# Patient Record
Sex: FEMALE | Race: WHITE | Employment: FULL TIME | URBAN - METROPOLITAN AREA
[De-identification: names, ages, dates, MRNs, and addresses within clinical notes are randomized per-mention and may not be internally consistent; named-entity substitution may affect disease eponyms.]

---

## 2018-03-12 ENCOUNTER — OFFICE VISIT (OUTPATIENT)
Dept: FAMILY MEDICINE CLINIC | Age: 48
End: 2018-03-12

## 2018-03-12 VITALS
WEIGHT: 197 LBS | SYSTOLIC BLOOD PRESSURE: 124 MMHG | OXYGEN SATURATION: 99 % | HEART RATE: 100 BPM | HEIGHT: 65 IN | TEMPERATURE: 97.9 F | DIASTOLIC BLOOD PRESSURE: 82 MMHG | RESPIRATION RATE: 19 BRPM | BODY MASS INDEX: 32.82 KG/M2

## 2018-03-12 DIAGNOSIS — E53.8 VITAMIN B12 DEFICIENCY: ICD-10-CM

## 2018-03-12 DIAGNOSIS — E66.9 OBESITY (BMI 30-39.9): Primary | ICD-10-CM

## 2018-03-12 DIAGNOSIS — I10 ESSENTIAL HYPERTENSION, BENIGN: ICD-10-CM

## 2018-03-12 DIAGNOSIS — E55.9 VITAMIN D DEFICIENCY: ICD-10-CM

## 2018-03-12 DIAGNOSIS — Z98.84 STATUS POST GASTRIC BYPASS FOR OBESITY: ICD-10-CM

## 2018-03-12 RX ORDER — DULAGLUTIDE 0.75 MG/.5ML
0.75 INJECTION, SOLUTION SUBCUTANEOUS
COMMUNITY
Start: 2018-03-02 | End: 2018-08-28 | Stop reason: SDUPTHER

## 2018-03-12 RX ORDER — PANTOPRAZOLE SODIUM 40 MG/1
40 TABLET, DELAYED RELEASE ORAL DAILY
COMMUNITY
Start: 2018-02-14 | End: 2018-09-14 | Stop reason: SDUPTHER

## 2018-03-12 RX ORDER — CYANOCOBALAMIN 1000 UG/ML
INJECTION, SOLUTION INTRAMUSCULAR; SUBCUTANEOUS
COMMUNITY
Start: 2018-03-11

## 2018-03-12 RX ORDER — VENLAFAXINE HYDROCHLORIDE 150 MG/1
CAPSULE, EXTENDED RELEASE ORAL
COMMUNITY
Start: 2018-02-14 | End: 2018-03-12 | Stop reason: SDUPTHER

## 2018-03-12 RX ORDER — HYDROCHLOROTHIAZIDE 25 MG/1
25 TABLET ORAL
COMMUNITY
Start: 2018-02-14 | End: 2021-06-16 | Stop reason: ALTCHOICE

## 2018-03-12 RX ORDER — EMPAGLIFLOZIN 10 MG/1
10 TABLET, FILM COATED ORAL DAILY
COMMUNITY
Start: 2018-02-28 | End: 2018-10-04 | Stop reason: ALTCHOICE

## 2018-03-12 RX ORDER — LISINOPRIL 5 MG/1
5 TABLET ORAL DAILY
COMMUNITY
Start: 2018-02-14 | End: 2018-07-09 | Stop reason: SDUPTHER

## 2018-03-12 NOTE — LETTER
3/21/2018 3:19 PM 
 
Ms. Chelsi Ackerman [address] [place] [address] Dear Lili Cesar: 
 
Please find your most recent results below. Resulted Orders VITAMIN B12 Result Value Ref Range Vitamin B12 1123 232 - 1245 pg/mL Narrative Performed at:  [address]  [id number] : Cliff Farrell MD, Phone:  [phone] METABOLIC PANEL, COMPREHENSIVE Result Value Ref Range Glucose 216 (H) 65 - 99 mg/dL BUN 24 6 - 24 mg/dL Creatinine 0.87 0.57 - 1.00 mg/dL GFR est non-AA 78 >59 mL/min/1.73 GFR est AA 90 >59 mL/min/1.73  
 BUN/Creatinine ratio 28 (H) 9 - 23 Sodium 135 134 - 144 mmol/L Potassium 3.6 3.5 - 5.2 mmol/L Chloride 94 (L) 96 - 106 mmol/L  
 CO2 25 18 - 29 mmol/L Calcium 9.5 8.7 - 10.2 mg/dL Protein, total 7.8 6.0 - 8.5 g/dL Albumin 4.3 3.5 - 5.5 g/dL GLOBULIN, TOTAL 3.5 1.5 - 4.5 g/dL A-G Ratio 1.2 1.2 - 2.2 Bilirubin, total 0.3 0.0 - 1.2 mg/dL Alk. phosphatase 126 (H) 39 - 117 IU/L  
 AST (SGOT) 15 0 - 40 IU/L  
 ALT (SGPT) 12 0 - 32 IU/L Narrative Performed at:  [address]  [id number] : Cliff Farrell MD, Phone:  [phone] VITAMIN D, 1, 25 DIHYDROXY Result Value Ref Range Calcitriol (Vit D 1, 25 di-OH) 71.0 19.9 - 79.3 pg/mL Narrative Performed at:  [address]  [id number] : Cliff Farrell MD, Phone:  [phone] RECOMMENDATIONS: 
The vitamin B12 level is great. You can go to an every other month or every three month injection routine. The kidney function and liver function look fine Come bck in 3 months to recheck the blood work. Please call me if you have any questions: [phone] Sincerely, 
 
 
Juvenal Beltran MD

## 2018-03-12 NOTE — PROGRESS NOTES
Chief Complaint   Patient presents with   [address]     she is a 46y.o. year old female who presents for evalution. Here to establish care, she has type 2 diabetes for 22 years  She is status post GBP  2005. She came off meds for a few years she gained 30 of 110 lbs back and the diabetes returned  She tried to avoid sweets and carbs  She was put on a 1200 irvin diet  She has a strong FH of early MI and her cholesterol is high  The endocrine doc wants her to start repatha  She c/o a lot of anxiety. She has a fear of her  or the grandkids dying  Last a1c was 8 in Feb 2018          Reviewed PmHx, RxHx, FmHx, SocHx, AllgHx and updated and dated in the chart. Aspirin yes ____   No____ N/A____    Patient Active Problem List    Diagnosis    Epigastric pain    Vulvar lesion    Menorrhagia    DM (diabetes mellitus) ([facility]    PMDD (premenstrual dysphoric disorder)    Depression    Vitamin B12 deficiency    Essential hypertension, benign    Syncope and collapse     7/2/[id number]-AVRK-Gsrumxks.   1/13/2010-ECHO-LVEF of 65% with no vlavular disease or Pulm HTN.-Dr. Demetrice Arroyo  1/13/2010-HOLTER MONITOR- Sinus rhythm with sinus tachycardia, rare PAC and PVC's.-Dr. García's         Nurse notes were reviewed and copied and are correct  Review of Systems - negative except as listed above in the HPI    Objective:     Vitals:    03/12/18 1324   BP: 124/82   Pulse: 100   Resp: 19   Temp: 97.9 °F (36.6 °C)   TempSrc: Oral   SpO2: 99%   Weight: 197 lb (89.4 kg)   Height: 5' 5\" (1.651 m)       Physical Examination: General appearance - alert, well appearing, and in no distress and oriented to person, place, and time  Mental status - alert, oriented to person, place, and time, normal mood, behavior, speech, dress, motor activity, and thought processes  Eyes - pupils equal and reactive, extraocular eye movements intact  Ears - bilateral TM's and external ear canals normal  Nose - normal and patent, no erythema, discharge or polyps  Mouth - mucous membranes moist, pharynx normal without lesions  Neck - supple, no significant adenopathy  Lymphatics - no palpable lymphadenopathy, no hepatosplenomegaly  Chest - clear to auscultation, no wheezes, rales or rhonchi, symmetric air entry  Heart - normal rate, regular rhythm, normal S1, S2, no murmurs, rubs, clicks or gallops  Abdomen - soft, nontender, nondistended, no masses or organomegaly  Neurological - alert, oriented, normal speech, no focal findings or movement disorder noted  Musculoskeletal - no joint tenderness, deformity or swelling  Extremities - peripheral pulses normal, no pedal edema, no clubbing or cyanosis  Skin - normal coloration and turgor, no rashes, no suspicious skin lesions noted      Assessment/ Plan:   Diagnoses and all orders for this visit:    1. Obesity (BMI 30-39.9)  -     REFERRAL TO PSYCHOLOGY  -     naltrexone-buPROPion (CONTRAVE) 8-90 mg TbER ER tablet; Week 1 1 tab PO QAM, Week 2 1QAM 1QHS, Week 3 2QAM 1 QHS, Week 4 & beyond 2QAM 2QHS    2. Uncontrolled type 2 diabetes mellitus without complication, without long-term current use of insulin ([address]  On multiple med  I couneled her for more than 50% of this more than 50 min visit on th eimportance of avoiding sweets and starches and moving more  3. Essential hypertension, benign  -     METABOLIC PANEL, COMPREHENSIVE    4. Status post gastric bypass for obesity  Monitoring b12 ans vit D etc.   5. Vitamin B12 deficiency  -     VITAMIN B12    6. Vitamin D deficiency  -     VITAMIN D, 1, 25 DIHYDROXY       Follow-up Disposition:  Return in about 2 weeks (around 3/26/2018). ICD-10-CM ICD-9-CM    1. Obesity (BMI 30-39. 9) E66.9 278.00 REFERRAL TO PSYCHOLOGY      naltrexone-buPROPion (CONTRAVE) 8-90 mg TbER ER tablet   2. Uncontrolled type 2 diabetes mellitus without complication, without long-term current use of insulin (HCC) E11.65 250.02    3.  Essential hypertension, benign J00 698.9 METABOLIC PANEL, COMPREHENSIVE 4. Status post gastric bypass for obesity Z98.84 V45.86    5. Vitamin B12 deficiency E53.8 266.2 VITAMIN B12   6. Vitamin D deficiency E55.9 268.9 VITAMIN D, 1, 25 DIHYDROXY       I have discussed the diagnosis with the patient and the intended plan as seen in the above orders. The patient has received an after-visit summary and questions were answered concerning future plans. Medication Side Effects and Warnings were discussed with patient: yes  Patient Labs were reviewed and or requested: yes  Patient Past Records were reviewed and or requested: yes        There are no Patient Instructions on file for this visit.     The patient verbalizes understanding and agrees with the plan of care        Patient has the advanced directives booklet to review

## 2018-03-12 NOTE — MR AVS SNAPSHOT
[address] [facility] [address] 
[phone] Patient: Cherise Lomas 
MRN: [id number] Visit Information Date & Time Provider Department Dept. Phone Encounter #  
 3/12/2018  1:30 PM Kevin Isidro MD [address] [place] [address] [id number] Follow-up Instructions Return in about 2 weeks (around 3/26/2018). Your Appointments 3/19/2018  9:10 AM  
New Patient with Ayla Bhandari MD  
[facility] Appt Note: new pt est PCP, CPE, h/o anxiety, depression, not sleeping  
 [address] [address] [address] [phone]  
  
   
 [address] [address] [address] Upcoming Health Maintenance Date Due  
 FOOT EXAM Q1 2/23/1978 MICROALBUMIN Q1 2/23/1978 EYE EXAM RETINAL OR DILATED Q1 2/23/1978 Pneumococcal 19-64 Medium Risk (1 of 1 - PPSV23) 2/23/1987 DTaP/Tdap/Td series (1 - Tdap) 2/23/1989 HEMOGLOBIN A1C Q6M 5/5/2013 LIPID PANEL Q1 7/9/2013 PAP AKA CERVICAL CYTOLOGY 10/28/2016 Influenza Age 5 to Adult 8/1/2017 BREAST CANCER SCRN MAMMOGRAM 2/23/2018 FOBT Q 1 YEAR AGE 50-75 2/23/2018 Allergies as of 3/12/2018  Review Complete On: 3/12/2018 By: Kevin Isidro MD  
  
 Severity Noted Reaction Type Reactions Reglan [Metoclopramide]  11/14/2011    Anxiety Current Immunizations  Reviewed on 11/14/2011 Name Date Influenza Vaccine Split 11/14/2011 Not reviewed this visit You Were Diagnosed With   
  
 Codes Comments Obesity (BMI 30-39.9)    -  Primary ICD-10-CM: L47.7 ICD-9-CM: 278.00 Uncontrolled type 2 diabetes mellitus without complication, without long-term current use of insulin ([facility]     ICD-10-CM: E11.65 ICD-9-CM: 250.02 Essential hypertension, benign     ICD-10-CM: I10 
ICD-9-CM: 401.1  Status post gastric bypass for obesity     ICD-10-CM: Z98.84 
 ICD-9-CM: V45.86 Vitamin B12 deficiency     ICD-10-CM: E53.8 ICD-9-CM: 266.2 Vitamin D deficiency     ICD-10-CM: E55.9 ICD-9-CM: 268.9 Vitals BP Pulse Temp Resp Height(growth percentile) Weight(growth percentile) 124/82 100 97.9 °F (36.6 °C) (Oral) 19 5' 5\" (1.651 m) 197 lb (89.4 kg) LMP SpO2 BMI OB Status Smoking Status 07/17/2015 99% 32.78 kg/m2 Hysterectomy Never Smoker Vitals History BMI and BSA Data Body Mass Index Body Surface Area 32.78 kg/m 2 2.02 m 2 Preferred Pharmacy Pharmacy Name Phone [facility] [address] Dip[address] Martinez [phone] Your Updated Medication List  
  
   
This list is accurate as of 3/12/18  2:07 PM.  Always use your most recent med list.  
  
  
  
  
 cyanocobalamin 1,000 mcg/mL injection Commonly known as:  VITAMIN B12  
  
 DAILY MULTI-VITAMINS/IRON tablet Generic drug:  multivitamin with iron Take 1 Tab by mouth daily. FARXIGA 10 mg Tab tablet Generic drug:  dapagliflozin Take 10 mg by mouth daily. hydroCHLOROthiazide 25 mg tablet Commonly known as:  HYDRODIURIL Take 25 mg by mouth daily as needed. ibuprofen 800 mg tablet Commonly known as:  MOTRIN Take 1 Tab by mouth every eight (8) hours as needed for Pain. JARDIANCE 10 mg tablet Generic drug:  empagliflozin Take 10 mg by mouth daily. JENTADUETO 2.5-1,000 mg per tablet Generic drug:  linagliptin-metFORMIN Take 1 Tab by mouth nightly. LIPITOR 20 mg tablet Generic drug:  atorvastatin Take 10 mg by mouth daily. lisinopril 5 mg tablet Commonly known as:  Rafia Herrmann Take 5 mg by mouth daily. lisinopril-hydroCHLOROthiazide 10-12.5 mg per tablet Commonly known as:  Favio Lawrence Take  by mouth daily. naltrexone-buPROPion 8-90 mg Tber ER tablet Commonly known as:  Amanda Lubin Week 1 1 tab PO QAM, Week 2 1QAM 1QHS, Week 3 2QAM 1 QHS, Week 4 & beyond 2QAM 2QHS  
  
 pantoprazole 40 mg tablet Commonly known as:  PROTONIX Take 40 mg by mouth daily. sucralfate 100 mg/mL suspension Commonly known as:  Zettie Furnish Take 10 mL by mouth four (4) times daily. TRULICITY 4.52 VV/3.2 mL sub-q pen Generic drug:  dulaglutide 0.75 mg by SubCUTAneous route every seven (7) days. Prescriptions Printed Refills  
 naltrexone-buPROPion (CONTRAVE) 8-90 mg TbER ER tablet 11 Sig: Week 1 1 tab PO QAM, Week 2 1QAM 1QHS, Week 3 2QAM 1 QHS, Week 4 & beyond 2QAM 2QHS Class: Print We Performed the Following METABOLIC PANEL, COMPREHENSIVE [09318 CPT(R)] REFERRAL TO PSYCHOLOGY [DQQ41 Custom] Comments:  
 eval and treat for anxiety and obesity Dr Mary Callahan [address] [place] [address] VITAMIN B12 [id number] CPT(R)] VITAMIN D, 1, 25 DIHYDROXY [58140 CPT(R)] Follow-up Instructions Return in about 2 weeks (around 3/26/2018). Referral Information Referral ID Referred By Referred To  
  
 [id number] Gayle Wetzel Not Available Visits Status Start Date End Date 1 New Request 3/12/18 3/12/19 If your referral has a status of pending review or denied, additional information will be sent to support the outcome of this decision. Introducing [facility] & HE[facility] SERVICES! Dear Riley Alonso: 
Thank you for requesting a [employer] account. Our records indicate that you already have an active [employer] account. You can access your account anytime at [email or web address] Did you know that you can access your hospital and ER discharge instructions at any time in [employer]? You can also review all of your test results from your hospital stay or ER visit. Additional Information If you have questions, please visit the Frequently Asked Questions section of the [employer] website at [email or web address] Remember, [employer] is NOT to be used for urgent needs. For medical emergencies, dial 911. Now available from your iPhone and Android! Please provide this summary of care documentation to your next provider. Your primary care clinician is listed as Aditya Giles. If you have any questions after today's visit, please call [phone].

## 2018-03-12 NOTE — PROGRESS NOTES
1. Have you been to the ER, urgent care clinic since your last visit? Hospitalized since your last visit? No    2. Have you seen or consulted any other health care providers outside of the [address] [place] [address] since your last visit? Include any pap smears or colon screening.  No     Chief Complaint   Patient presents with   [address]

## 2018-03-13 LAB
1,25(OH)2D3 SERPL-MCNC: 71 PG/ML (ref 19.9–79.3)
ALBUMIN SERPL-MCNC: 4.3 G/DL (ref 3.5–5.5)
ALBUMIN/GLOB SERPL: 1.2 {RATIO} (ref 1.2–2.2)
ALP SERPL-CCNC: 126 IU/L (ref 39–117)
ALT SERPL-CCNC: 12 IU/L (ref 0–32)
AST SERPL-CCNC: 15 IU/L (ref 0–40)
BILIRUB SERPL-MCNC: 0.3 MG/DL (ref 0–1.2)
BUN SERPL-MCNC: 24 MG/DL (ref 6–24)
BUN/CREAT SERPL: 28 (ref 9–23)
CALCIUM SERPL-MCNC: 9.5 MG/DL (ref 8.7–10.2)
CHLORIDE SERPL-SCNC: 94 MMOL/L (ref 96–106)
CO2 SERPL-SCNC: 25 MMOL/L (ref 18–29)
CREAT SERPL-MCNC: 0.87 MG/DL (ref 0.57–1)
GFR SERPLBLD CREATININE-BSD FMLA CKD-EPI: 78 ML/MIN/1.73
GFR SERPLBLD CREATININE-BSD FMLA CKD-EPI: 90 ML/MIN/1.73
GLOBULIN SER CALC-MCNC: 3.5 G/DL (ref 1.5–4.5)
GLUCOSE SERPL-MCNC: 216 MG/DL (ref 65–99)
POTASSIUM SERPL-SCNC: 3.6 MMOL/L (ref 3.5–5.2)
PROT SERPL-MCNC: 7.8 G/DL (ref 6–8.5)
SODIUM SERPL-SCNC: 135 MMOL/L (ref 134–144)
VIT B12 SERPL-MCNC: 1123 PG/ML (ref 232–1245)

## 2018-03-19 NOTE — PROGRESS NOTES
The vitamin B12 level is great. You can go to an every other month or every three month injection routine. The kidney function and liver function look fine  Come bck in 3 months to recheck the blood work.

## 2018-03-21 ENCOUNTER — OFFICE VISIT (OUTPATIENT)
Dept: FAMILY MEDICINE CLINIC | Age: 48
End: 2018-03-21

## 2018-03-21 VITALS
BODY MASS INDEX: 32.82 KG/M2 | DIASTOLIC BLOOD PRESSURE: 82 MMHG | HEART RATE: 91 BPM | TEMPERATURE: 98.2 F | HEIGHT: 65 IN | OXYGEN SATURATION: 98 % | WEIGHT: 197 LBS | SYSTOLIC BLOOD PRESSURE: 130 MMHG | RESPIRATION RATE: 18 BRPM

## 2018-03-21 DIAGNOSIS — T50.905A ADVERSE EFFECT OF DRUG, INITIAL ENCOUNTER: Primary | ICD-10-CM

## 2018-03-21 RX ORDER — REPAGLINIDE 1 MG/1
1 TABLET ORAL
COMMUNITY
End: 2018-07-09

## 2018-03-21 NOTE — PATIENT INSTRUCTIONS
Side Effects of Medicine: Care Instructions  Your Care Instructions    Medicines are a big part of treatment for many health problems. But all medicines have side effects. Often these are mild problems. They might include a dry mouth or upset stomach. But sometimes medicines can cause dangerous side effects. One example is a bad allergic reaction. The best treatment will depend on what side effects you have. If you have a serious side effect, you may need to stop taking the medicine. You may also need to take another medicine to treat the side effect. If you have a mild side effect, it may go away after you take the medicine for a while. The doctor has checked you carefully, but problems can develop later. If you notice any problems or new symptoms, get medical treatment right away. Follow-up care is a key part of your treatment and safety. Be sure to make and go to all appointments, and call your doctor if you are having problems. It's also a good idea to know your test results and keep a list of the medicines you take. How can you care for yourself at home? · Be safe with medicines. Take your medicines exactly as prescribed. Call your doctor if you think you are having a problem with your medicine. · Call your doctor if side effects bother you and you wonder if you should keep taking a medicine. Your doctor may be able to lower your dose or change your medicine. Do not suddenly quit taking your medicine unless a doctor tells you to. · Make sure your doctor has a list of all the medicines, vitamins, supplements, and herbal remedies you take. Ask about side effects. When should you call for help? Watch closely for changes in your health, and be sure to contact your doctor if:  ? · You think you are having a new problem with your medicine. ? · You do not get better as expected. Where can you learn more? Go to http://lázaro-any.info/.   Enter D152 in the search box to learn more about \"Side Effects of Medicine: Care Instructions. \"  Current as of: August 14, 2016  Content Version: 11.4  © 3452-3530 [employer]. Care instructions adapted under license by [employer] (which disclaims liability or warranty for this information). If you have questions about a medical condition or this instruction, always ask your healthcare professional. [address] any warranty or liability for your use of this information.

## 2018-03-21 NOTE — PROGRESS NOTES
KAYLAM advising pt that labs were available on [employer] and she can contact our office back with any questions. Advised that I will also send the results/recommnedations to address on file.

## 2018-03-21 NOTE — PROGRESS NOTES
1. Have you been to the ER, urgent care clinic since your last visit? Hospitalized since your last visit? No    2. Have you seen or consulted any other health care providers outside of the [address] [place] [address] since your last visit? Include any pap smears or colon screening.  No     Chief Complaint   Patient presents with    Medication Evaluation

## 2018-03-21 NOTE — MR AVS SNAPSHOT
[address] [facility] [address] 
[phone] Patient: Rosalia Phoenix 
MRN: [id number] Visit Information Date & Time Provider Department Dept. Phone Encounter #  
 3/21/2018  7:00 AM Faye Wayne MD [address] [place] [address] [id number] Follow-up Instructions Return if symptoms worsen or fail to improve. Your Appointments 3/27/2018  1:45 PM  
ROUTINE CARE with Faye Wayne MD  
. Norm[address] Le[address] [place] [address]) Appt Note: 2 weeks [address] [phone]  
[place] [address] Upcoming Health Maintenance Date Due  
 FOOT EXAM Q1 2/23/1978 MICROALBUMIN Q1 2/23/1978 EYE EXAM RETINAL OR DILATED Q1 2/23/1978 Pneumococcal 19-64 Medium Risk (1 of 1 - PPSV23) 2/23/1987 DTaP/Tdap/Td series (1 - Tdap) 2/23/1989 HEMOGLOBIN A1C Q6M 5/5/2013 LIPID PANEL Q1 7/9/2013 PAP AKA CERVICAL CYTOLOGY 10/28/2016 Influenza Age 5 to Adult 8/1/2017 BREAST CANCER SCRN MAMMOGRAM 2/23/2018 FOBT Q 1 YEAR AGE 50-75 2/23/2018 Allergies as of 3/21/2018  Review Complete On: 3/21/2018 By: Faye Wayne MD  
  
 Severity Noted Reaction Type Reactions Reglan [Metoclopramide]  11/14/2011    Anxiety Simvastatin  03/21/2018    Myalgia, Other (comments) Causes abnormal liver enzymes Current Immunizations  Reviewed on 11/14/2011 Name Date Influenza Vaccine Split 11/14/2011 Not reviewed this visit You Were Diagnosed With   
  
 Codes Comments Adverse effect of drug, initial encounter    -  Primary ICD-10-CM: T88. 7XXA ICD-9-CM: E947.9 Vitals BP Pulse Temp Resp Height(growth percentile) Weight(growth percentile) 130/82 91 98.2 °F (36.8 °C) (Oral) 18 5' 5\" (1.651 m) 197 lb (89.4 kg) LMP SpO2 BMI OB Status Smoking Status 07/17/2015 98% 32.78 kg/m2 Hysterectomy Never Smoker Vitals History BMI and BSA Data Body Mass Index Body Surface Area 32.78 kg/m 2 2.02 m 2 Preferred Pharmacy Pharmacy Name Phone [facility] [address] Martinez Medal [phone] Your Updated Medication List  
  
   
This list is accurate as of 3/21/18  8:04 AM.  Always use your most recent med list.  
  
  
  
  
 cyanocobalamin 1,000 mcg/mL injection Commonly known as:  VITAMIN B12  
  
 DAILY MULTI-VITAMINS/IRON tablet Generic drug:  multivitamin with iron Take 1 Tab by mouth daily. FARXIGA 10 mg Tab tablet Generic drug:  dapagliflozin Take 10 mg by mouth daily. hydroCHLOROthiazide 25 mg tablet Commonly known as:  HYDRODIURIL Take 25 mg by mouth daily as needed. ibuprofen 800 mg tablet Commonly known as:  MOTRIN Take 1 Tab by mouth every eight (8) hours as needed for Pain. JARDIANCE 10 mg tablet Generic drug:  empagliflozin Take 10 mg by mouth daily. JENTADUETO 2.5-1,000 mg per tablet Generic drug:  linagliptin-metFORMIN Take 1 Tab by mouth nightly. LIPITOR 20 mg tablet Generic drug:  atorvastatin Take 10 mg by mouth daily. lisinopril 5 mg tablet Commonly known as:  Davie Dubonnet Take 5 mg by mouth daily. lisinopril-hydroCHLOROthiazide 10-12.5 mg per tablet Commonly known as:  Juanita Little Take  by mouth daily. naltrexone-buPROPion 8-90 mg Tber ER tablet Commonly known as:  Tamara Lombard Week 1 1 tab PO QAM, Week 2 1QAM 1QHS, Week 3 2QAM 1 QHS, Week 4 & beyond 2QAM 2QHS  
  
 pantoprazole 40 mg tablet Commonly known as:  PROTONIX Take 40 mg by mouth daily. repaglinide 1 mg tablet Commonly known as:  Asia Eisenmenger Take 1 mg by mouth Before breakfast, lunch, and dinner. sucralfate 100 mg/mL suspension Commonly known as:  Omer Opal Take 10 mL by mouth four (4) times daily. EDGARITY 3.72 KD/7.3 mL sub-q pen Generic drug:  dulaglutide 0.75 mg by SubCUTAneous route every seven (7) days. Follow-up Instructions Return if symptoms worsen or fail to improve. Patient Instructions Side Effects of Medicine: Care Instructions Your Care Instructions Medicines are a big part of treatment for many health problems. But all medicines have side effects. Often these are mild problems. They might include a dry mouth or upset stomach. But sometimes medicines can cause dangerous side effects. One example is a bad allergic reaction. The best treatment will depend on what side effects you have. If you have a serious side effect, you may need to stop taking the medicine. You may also need to take another medicine to treat the side effect. If you have a mild side effect, it may go away after you take the medicine for a while. The doctor has checked you carefully, but problems can develop later. If you notice any problems or new symptoms, get medical treatment right away. Follow-up care is a key part of your treatment and safety. Be sure to make and go to all appointments, and call your doctor if you are having problems. It's also a good idea to know your test results and keep a list of the medicines you take. How can you care for yourself at home? · Be safe with medicines. Take your medicines exactly as prescribed. Call your doctor if you think you are having a problem with your medicine. · Call your doctor if side effects bother you and you wonder if you should keep taking a medicine. Your doctor may be able to lower your dose or change your medicine. Do not suddenly quit taking your medicine unless a doctor tells you to. · Make sure your doctor has a list of all the medicines, vitamins, supplements, and herbal remedies you take. Ask about side effects. When should you call for help?  
Watch closely for changes in your health, and be sure to contact your doctor if: 
? · You think you are having a new problem with your medicine. ? · You do not get better as expected. Where can you learn more? Go to http://lázaro-any.info/. Enter D152 in the search box to learn more about \"Side Effects of Medicine: Care Instructions. \" Current as of: August 14, 2016 Content Version: 11.4 © 1719-3914 [employer]. Care instructions adapted under license by [employer] (which disclaims liability or warranty for this information). If you have questions about a medical condition or this instruction, always ask your healthcare professional. [place] [address] any warranty or liability for your use of this information. Introducing [facility] & HEALTH SERVICES! Dear Whit Chaparro: 
Thank you for requesting a [employer] account. Our records indicate that you already have an active Head[employer] account. You can access your account anytime at [email or web address] Did you know that you can access your hospital and ER discharge instructions at any time in [employer]? You can also review all of your test results from your hospital stay or ER visit. Additional Information If you have questions, please visit the Frequently Asked Questions section of the [employer] website at [email or web address] Remember, Head[employer] is NOT to be used for urgent needs. For medical emergencies, dial 911. Now available from your iPhone and Android! Please provide this summary of care documentation to your next provider. Your primary care clinician is listed as Jazmin Morejon. If you have any questions after today's visit, please call [phone].

## 2018-03-21 NOTE — PROGRESS NOTES
Chief Complaint   Patient presents with    Medication Evaluation     she is a 46y.o. year old female who presents for evalution. She is switching from effexor to [place] citizen [place]  She started shaking 3 days after starting the contrave. She had stopped the effexoe  She has not had effexor since Saturday. The shaking has gotten a lot better  Now. She is supposed to go to two a  Day today. She had nausea this morning    She is having regular bowel movements. Doing well other than the shakiness    Reviewed PmHx, RxHx, FmHx, SocHx, AllgHx and updated and dated in the chart. Aspirin yes ____   No____ N/A____    Patient Active Problem List    Diagnosis    Epigastric pain    Vulvar lesion    Menorrhagia    DM (diabetes mellitus) ([facility] [facility]    PMDD (premenstrual dysphoric disorder)    Depression    Vitamin B12 deficiency    Essential hypertension, benign    Syncope and collapse     7/2/20108421-DXPN-[id number].   1/13/2010-ECHO-LVEF of 65% with no vlavular disease or Pulm HTN.-Dr. Kenia Flower  1/13/2010-HOLTER MONITOR- Sinus rhythm with sinus tachycardia, rare PAC and PVC's.-Dr. García's         Nurse notes were reviewed and copied and are correct  Review of Systems - negative except as listed above in the HPI    Objective:     Vitals:    03/21/18 0737   BP: 130/82   Pulse: 91   Resp: 18   Temp: 98.2 °F (36.8 °C)   TempSrc: Oral   SpO2: 98%   Weight: 197 lb (89.4 kg)   Height: 5' 5\" (1.651 m)       Physical Examination: General appearance - alert, well appearing, and in no distress  Mental status - alert, oriented to person, place, and time  Chest - clear to auscultation, no wheezes, rales or rhonchi, symmetric air entry  Heart - normal rate, regular rhythm, normal S1, S2, no murmurs, rubs, clicks or gallops  Abdomen - soft, nontender, nondistended, no masses or organomegaly  Musculoskeletal - no joint tenderness, deformity or swelling  Extremities - peripheral pulses normal, no pedal edema, no clubbing or cyanosis  Skin - normal coloration and turgor, no rashes, no suspicious skin lesions noted      Assessment/ Plan:   Diagnoses and all orders for this visit:    1. Adverse effect of drug, initial encounter    the effects are wearing off  Stay at the one a day of contrave for another 7days then go to two a day if needed     Follow-up Disposition:  Return if symptoms worsen or fail to improve. ICD-10-CM ICD-9-CM    1. Adverse effect of drug, initial encounter T88. 7XXA E947.9        I have discussed the diagnosis with the patient and the intended plan as seen in the above orders. The patient has received an after-visit summary and questions were answered concerning future plans. Medication Side Effects and Warnings were discussed with patient: yes  Patient Labs were reviewed and or requested: yes  Patient Past Records were reviewed and or requested: yes        There are no Patient Instructions on file for this visit.     The patient verbalizes understanding and agrees with the plan of care        Patient has the advanced directives booklet to review

## 2018-03-27 ENCOUNTER — OFFICE VISIT (OUTPATIENT)
Dept: FAMILY MEDICINE CLINIC | Age: 48
End: 2018-03-27

## 2018-03-27 VITALS
HEIGHT: 65 IN | TEMPERATURE: 98.1 F | SYSTOLIC BLOOD PRESSURE: 122 MMHG | HEART RATE: 107 BPM | WEIGHT: 193.5 LBS | BODY MASS INDEX: 32.24 KG/M2 | DIASTOLIC BLOOD PRESSURE: 84 MMHG | RESPIRATION RATE: 18 BRPM | OXYGEN SATURATION: 99 %

## 2018-03-27 DIAGNOSIS — E11.9 WELL CONTROLLED TYPE 2 DIABETES MELLITUS (HCC): ICD-10-CM

## 2018-03-27 DIAGNOSIS — E66.9 OBESITY (BMI 30-39.9): ICD-10-CM

## 2018-03-27 DIAGNOSIS — I10 ESSENTIAL HYPERTENSION, BENIGN: Primary | ICD-10-CM

## 2018-03-27 RX ORDER — DICLOFENAC SODIUM 10 MG/G
2 GEL TOPICAL 4 TIMES DAILY
Qty: 1 EACH | Refills: 3 | Status: SHIPPED | OUTPATIENT
Start: 2018-03-27 | End: 2018-10-04 | Stop reason: ALTCHOICE

## 2018-03-27 NOTE — PROGRESS NOTES
Weight Loss Progress Note: follow up Physician Visit      Laureano Diamond is a 46 y.o. female with BMI ,who is here for her follow up  Evaluation for the medical bariatric care. Getting hungry in pm. Taking contrave once a da in am  No more shakiness or nausea or headache  She thinks she is interested in the mswl  She typically drinks sweet tea    Today so far  B: banana  sn  L: habachi steak with rice and veggies from [employer]  Sn  Drinks: water w crystal light  DM  Fasting this am 126        CC: I want to be healthier    Weight History  Current weight 193 (-4)and BMI is Body mass index is 32.2 kg/(m^2). Goal weight , BMI 29  Highest weight 197  (See weight gain time line scanned into media section of chart)        Significant Medical History    Update on sleep apnea and  CPAP no    Ever had bariatric surgery: no    Pregnant or planning on becoming pregnant w/in 6 months: no         Significant Psychosocial History   Any history of drug abuse or dependence: no  Current Major Lifestyle Changes: no  Any potential unsupportive people: no          Social History  Social History   Substance Use Topics    Smoking status: Never Smoker    Smokeless tobacco: Never Used    Alcohol use No      Comment: socially     How many times a week do you eat out?  2-3    Do you drink any EtOH?  no   If so, how much? Do you have upcoming any travel in the next 6 weeks?  no   If so, what do you have planned? Exercise  How many days a week do you currently exercise?   2-3 days  Have you ever been told by a physician not to exercise?  no      Objective  Visit Vitals    /84    Pulse (!) 107    Temp 98.1 °F (36.7 °C) (Oral)    Resp 18    Ht 5' 5\" (1.651 m)    Wt 193 lb 8 oz (87.8 kg)    LMP 07/17/2015    SpO2 99%    BMI 32.2 kg/m2       Bicep - (right ) circumference  Waist Circumference: See Weight Management Doc Flowsheet  Neck Circumference: See Weight Management Doc Flowsheet  Percent Body Fat: See Weight Management Doc Flowsheet  Weight Metrics 3/27/2018 3/27/2018 3/21/2018 3/12/2018 12/1/2015 7/23/2015 4/17/2015   Weight - 193 lb 8 oz 197 lb 197 lb 179 lb 8 oz 190 lb 195 lb 5 oz   Neck Circ (inches) 14 - - - - - -   Waist Measure Inches 38.5 - - - - - -   Exercise Mins/week 120 - - - - - -   Body Fat % 39.1 - - - - - -   BMI - 32.2 kg/m2 32.78 kg/m2 32.78 kg/m2 29.87 kg/m2 31.62 kg/m2 32.5 kg/m2         Labs:       Review of Systems  Complete ROS negative except where noted above      Physical Exam    Vital Signs Reviewed  Weight Management Metrics Reviewed    Vital Signs Reviewed  Appearance: Obese, A&O x 3, NAD  HEENT:  NC/AT, EOMI, PERRL, No scleral icterus, malampatti score:4  Skin:    Skin tags - no   Acanthosis Nigricans -no   Neck:  No nodes, thyromegaly   Heart:  RRR without M/R/G  Lungs:  CTAB, no rhonchi, rales, or wheezes with good air exchange   Abdomen:  Non-tender, pos bowel sounds; hepatomegaly -   Ext:  No C/C/E        Assessment & Plan  Diagnoses and all orders for this visit:    1. Essential hypertension, benign  BP controlled  2. Well controlled type 2 diabetes mellitus ([address]  Continue same meds. Monitor nightly and will adjust as indicated  3. Obesity (BMI 30-39. 9)    Other orders  -     diclofenac (VOLTAREN) 1 % gel; Apply 2 g to affected area four (4) times daily. Based on his history and exam, Tammy K Adelene Gottron is a good candidate for the  [facility] Program     Diet:low carb LCD    Activity:encouraged 4 days a week 60 min    Medication:cont same meds    There are no Patient Instructions on file for this visit. Follow-up Disposition: Not on File    Over 50% of the 30 minutes face to face time with Yudy consisted of counseling & coordinating and/or discussing treatment plans in reference to her obesity The primary encounter diagnosis was Essential hypertension, benign. Diagnoses of Well controlled type 2 diabetes mellitus ([address] and Obesity (BMI 30-39. 9) were also pertinent to this visit.  The patient verbalizes understanding and agrees with the plan of care    Patient has the advanced directives  booklet to review

## 2018-03-27 NOTE — MR AVS SNAPSHOT
[address] [address] 
[phone] Patient: Cherise Lomas 
MRN: [id number] XWS:[id number] Visit Information Date & Time Provider Department Dept. Phone Encounter #  
 3/27/2018  1:45 PM Kevin Isidro MD [address] [place] [address] [id number] Upcoming Health Maintenance Date Due  
 FOOT EXAM Q1 2/23/1978 MICROALBUMIN Q1 2/23/1978 EYE EXAM RETINAL OR DILATED Q1 2/23/1978 Pneumococcal 19-64 Medium Risk (1 of 1 - PPSV23) 2/23/1987 DTaP/Tdap/Td series (1 - Tdap) 2/23/1989 HEMOGLOBIN A1C Q6M 5/5/2013 LIPID PANEL Q1 7/9/2013 PAP AKA CERVICAL CYTOLOGY 10/28/2016 Influenza Age 5 to Adult 8/1/2017 BREAST CANCER SCRN MAMMOGRAM 2/23/2018 FOBT Q 1 YEAR AGE 50-75 2/23/2018 Allergies as of 3/27/2018  Review Complete On: 3/27/2018 By: Kevin Isidro MD  
  
 Severity Noted Reaction Type Reactions Reglan [Metoclopramide]  11/14/2011    Anxiety Simvastatin  03/21/2018    Myalgia, Other (comments) Causes abnormal liver enzymes Current Immunizations  Reviewed on 11/14/2011 Name Date Influenza Vaccine Split 11/14/2011 Not reviewed this visit You Were Diagnosed With   
  
 Codes Comments Essential hypertension, benign    -  Primary ICD-10-CM: I10 
ICD-9-CM: 401.1 Well controlled type 2 diabetes mellitus ([facility]     ICD-10-CM: E11.9 ICD-9-CM: 250.00 Obesity (BMI 30-39. 9)     ICD-10-CM: E66.9 ICD-9-CM: 278.00 Vitals BP Pulse Temp Resp Height(growth percentile) Weight(growth percentile) 122/84 (!) 107 98.1 °F (36.7 °C) (Oral) 18 5' 5\" (1.651 m) 193 lb 8 oz (87.8 kg) LMP SpO2 BMI OB Status Smoking Status 07/17/2015 99% 32.2 kg/m2 Hysterectomy Never Smoker Vitals History BMI and BSA Data Body Mass Index Body Surface Area  
 32.2 kg/m 2 2.01 m 2 Preferred Pharmacy Pharmacy Name Phone [facility] [address] [place] [address] Dipika Carrillo [place] [phone] Your Updated Medication List  
  
   
This list is accurate as of 3/27/18  2:43 PM.  Always use your most recent med list.  
  
  
  
  
 cyanocobalamin 1,000 mcg/mL injection Commonly known as:  VITAMIN B12  
  
 DAILY MULTI-VITAMINS/IRON tablet Generic drug:  multivitamin with iron Take 1 Tab by mouth daily. diclofenac 1 % Gel Commonly known as:  VOLTAREN Apply 2 g to affected area four (4) times daily. hydroCHLOROthiazide 25 mg tablet Commonly known as:  HYDRODIURIL Take 25 mg by mouth daily as needed. JARDIANCE 10 mg tablet Generic drug:  empagliflozin Take 10 mg by mouth daily. lisinopril 5 mg tablet Commonly known as:  Marlane Shantanu Take 5 mg by mouth daily. naltrexone-buPROPion 8-90 mg Tber ER tablet Commonly known as:  Link Pedro Week 1 1 tab PO QAM, Week 2 1QAM 1QHS, Week 3 2QAM 1 QHS, Week 4 & beyond 2QAM 2QHS  
  
 pantoprazole 40 mg tablet Commonly known as:  PROTONIX Take 40 mg by mouth daily. repaglinide 1 mg tablet Commonly known as:  [place] Take 1 mg by mouth Before breakfast, lunch, and dinner. TRULICITY 0.09 LJ/8.6 mL sub-q pen Generic drug:  dulaglutide 0.75 mg by SubCUTAneous route every seven (7) days. Prescriptions Sent to Pharmacy Refills  
 diclofenac (VOLTAREN) 1 % gel 3 Sig: Apply 2 g to affected area four (4) times daily. Class: Normal  
 Pharmacy: [facility]ast[facility] Dipika Carrillo [place] Ph #: [phone] Route: Topical  
  
Introducing [facility] & HEALTH SERVICES! Dear Raffi Potts: 
Thank you for requesting a [employer] account. Our records indicate that you already have an active [employer] account. You can access your account anytime at [email or web address] [employer] Did you know that you can access your hospital and ER discharge instructions at any time in [employer]? You can also review all of your test results from your hospital stay or ER visit. Additional Information If you have questions, please visit the Frequently Asked Questions section of the [employer] website at [email or web address] Remember, [employer] is NOT to be used for urgent needs. For medical emergencies, dial 911. Now available from your iPhone and Android! Please provide this summary of care documentation to your next provider. Your primary care clinician is listed as Vania Araujo. If you have any questions after today's visit, please call [phone].

## 2018-03-27 NOTE — PROGRESS NOTES
1. Have you been to the ER, urgent care clinic since your last visit? Hospitalized since your last visit? No    2. Have you seen or consulted any other health care providers outside of the [address] [place] [address] since your last visit? Include any pap smears or colon screening.  No      Chief Complaint   Patient presents with    Weight Management     Body Weight: 193.5  Body Fat%: 39.1  Muscle Mass Weight:31.9  Body Water Weight: 89.9  Basal Metabolic Rate: 4765  BMI: 32.20

## 2018-04-05 ENCOUNTER — OFFICE VISIT (OUTPATIENT)
Dept: FAMILY MEDICINE CLINIC | Age: 48
End: 2018-04-05

## 2018-04-05 VITALS
OXYGEN SATURATION: 98 % | HEIGHT: 65 IN | WEIGHT: 196 LBS | RESPIRATION RATE: 19 BRPM | TEMPERATURE: 98 F | SYSTOLIC BLOOD PRESSURE: 121 MMHG | BODY MASS INDEX: 32.65 KG/M2 | DIASTOLIC BLOOD PRESSURE: 81 MMHG | HEART RATE: 90 BPM

## 2018-04-05 DIAGNOSIS — E66.9 OBESITY (BMI 30-39.9): ICD-10-CM

## 2018-04-05 DIAGNOSIS — F32.A DEPRESSION, UNSPECIFIED DEPRESSION TYPE: ICD-10-CM

## 2018-04-05 DIAGNOSIS — I10 ESSENTIAL HYPERTENSION, BENIGN: Primary | ICD-10-CM

## 2018-04-05 PROBLEM — F32.1 MODERATE MAJOR DEPRESSION (HCC): Status: ACTIVE | Noted: 2018-04-05

## 2018-04-05 RX ORDER — BUPROPION HYDROCHLORIDE 75 MG/1
150 TABLET ORAL DAILY
Qty: 60 TAB | Refills: 4 | Status: SHIPPED | OUTPATIENT
Start: 2018-04-05 | End: 2018-08-29 | Stop reason: SDUPTHER

## 2018-04-05 NOTE — PROGRESS NOTES
1. Have you been to the ER, urgent care clinic since your last visit? Hospitalized since your last visit? No    2. Have you seen or consulted any other health care providers outside of the [address] [place] [address] since your last visit? Include any pap smears or colon screening.  No     Chief Complaint   Patient presents with    Depression

## 2018-04-05 NOTE — PROGRESS NOTES
Weight Loss Progress Note: follow up Physician Visit      Odessa Paez is a 46 y.o. female with BMI 32.62 who is here for her follow up  Evaluation for the medical bariatric care. CC: I want to be healthier    Weight History  Current weight 196  and BMI is Body mass index is 32.62 kg/(m^2). Goal weight BMI 29   Highest weight 197   (See weight gain time line scanned into media section of chart)        Significant Medical History    Update on sleep apnea and  CPAP no    Ever had bariatric surgery: no    Pregnant or planning on becoming pregnant w/in 6 months: no         Significant Psychosocial History   Any history of drug abuse or dependence: no  Current Major Lifestyle Changes: no  Any potential unsupportive people: no          Social History  Social History   Substance Use Topics    Smoking status: Never Smoker    Smokeless tobacco: Never Used    Alcohol use No      Comment: socially     How many times a week do you eat out? A lot of traveling      Do you drink any EtOH?  no   If so, how much? Do you have upcoming any travel in the next 6 weeks?  no   If so, what do you have planned?           Exercise  How many days a week do you currently exercise?  0 days  Have you ever been told by a physician not to exercise?  no      Objective  Visit Vitals    /81    Pulse 90    Temp 98 °F (36.7 °C) (Oral)    Resp 19    Ht 5' 5\" (1.651 m)    Wt 196 lb (88.9 kg)    LMP 07/17/2015    SpO2 98%    BMI 32.62 kg/m2       Bicep - (right ) circumference  Waist Circumference: See Weight Management Doc Flowsheet  Neck Circumference: See Weight Management Doc Flowsheet  Percent Body Fat: See Weight Management Doc Flowsheet  Weight Metrics 4/5/2018 4/5/2018 3/27/2018 3/27/2018 3/21/2018 3/12/2018 12/1/2015   Weight - 196 lb - 193 lb 8 oz 197 lb 197 lb 179 lb 8 oz   Neck Circ (inches) 14.5 - 14 - - - -   Waist Measure Inches 38 - 38.5 - - - -   Exercise Mins/week 0 - 120 - - - -   Body Fat % - - 39.1 - - - -   BMI - 32.62 kg/m2 - 32.2 kg/m2 32.78 kg/m2 32.78 kg/m2 29.87 kg/m2         Labs:       Review of Systems  Complete ROS negative except where noted above      Physical Exam    Vital Signs Reviewed  Weight Management Metrics Reviewed    Vital Signs Reviewed  Appearance: Obese, A&O x 3, NAD  HEENT:  NC/AT, EOMI, PERRL, No scleral icterus, malampatti score:4  Skin:    Skin tags - no   Acanthosis Nigricans -no   Neck:  No nodes, thyromegaly   Heart:  RRR without M/R/G  Lungs:  CTAB, no rhonchi, rales, or wheezes with good air exchange   Abdomen:  Non-tender, pos bowel sounds; hepatomegaly -   Ext:  No C/C/E        Assessment & Plan  Diagnoses and all orders for this visit:    1. Essential hypertension, benign  Cont same meds, bp at goal  2. Depression, unspecified depression type  -     buPROPion (WELLBUTRIN) 75 mg tablet; Take 2 Tabs by mouth daily. This will help appetite as wel as depression  3. Obesity (BMI 30-39. 9)  Diet:get back on the LCD    Activity: do some activtiy 30 min 4 days a week    Medication: adjusting the contrave to 2 tabs in am  and added wellbutrin 150 mg all by itself in the pm  Additional wellbutin, together there is a therapeutic dose    Based on his history and exam, Yudy Serrano is a good candidate for the  [facility] Program         There are no Patient Instructions on file for this visit. Follow-up Disposition:  Return in about 2 weeks (around 4/19/2018). Over 50% of the 30 minutes face to face time with Yudy consisted of counseling & coordinating and/or discussing treatment plans in reference to her obesity The primary encounter diagnosis was Essential hypertension, benign. Diagnoses of Depression, unspecified depression type and Obesity (BMI 30-39. 9) were also pertinent to this visit.   The patient verbalizes understanding and agrees with the plan of care    Patient has the advanced directives  booklet to review

## 2018-04-05 NOTE — MR AVS SNAPSHOT
[address] [facility] [address] 
[phone] Patient: Shy Harris 
MRN: [id number] Visit Information Date & Time Provider Department Dept. Phone Encounter #  
 2018  4:00 PM Vic Teresa MD [address] [place] [address] [id number] Follow-up Instructions Return in about 2 weeks (around 2018). Your Appointments 4/10/2018  8:30 AM  
WALK IN with ORIENTATION_BCPC [employer] Program ([place] [facility]) Appt Note: MSWL Orientation BCP 2018; MSWL Orientation [facility] 04/10/2018 [employer] [facility] ([facility]) [phone] Upcoming Health Maintenance Date Due  
 FOOT EXAM Q1 1978 MICROALBUMIN Q1 1978 EYE EXAM RETINAL OR DILATED Q1 1978 Pneumococcal 19-64 Medium Risk (1 of 1 - PPSV23) 1987 DTaP/Tdap/Td series (1 - Tdap) 1989 HEMOGLOBIN A1C Q6M 2013 LIPID PANEL Q1 2013 PAP AKA CERVICAL CYTOLOGY 10/28/2016 Influenza Age 5 to Adult 2017 BREAST CANCER SCRN MAMMOGRAM 2018 FOBT Q 1 YEAR AGE 50-75 2018 Allergies as of 2018  Review Complete On: 2018 By: Vic Teresa MD  
  
 Severity Noted Reaction Type Reactions Reglan [Metoclopramide]  2011    Anxiety Simvastatin  2018    Myalgia, Other (comments) Causes abnormal liver enzymes Current Immunizations  Reviewed on 2011 Name Date Influenza Vaccine Split 2011 Not reviewed this visit You Were Diagnosed With   
  
 Codes Comments Essential hypertension, benign    -  Primary ICD-10-CM: I10 
ICD-9-CM: 401.1 Depression, unspecified depression type     ICD-10-CM: F32.9 ICD-9-CM: 042 Obesity (BMI 30-39. 9)     ICD-10-CM: E66.9 ICD-9-CM: 278.00 Vitals BP Pulse Temp Resp Height(growth percentile) Weight(growth percentile) 121/81 90 98 °F (36.7 °C) (Oral) 19 5' 5\" (1.651 m) 196 lb (88.9 kg) LMP SpO2 BMI OB Status Smoking Status 07/17/2015 98% 32.62 kg/m2 Hysterectomy Never Smoker Vitals History BMI and BSA Data Body Mass Index Body Surface Area  
 32.62 kg/m 2 2.02 m 2 Preferred Pharmacy Pharmacy Name Phone [facility] Dip[facility] [phone] Your Updated Medication List  
  
   
This list is accurate as of 4/5/18  4:31 PM.  Always use your most recent med list.  
  
  
  
  
 buPROPion 75 mg tablet Commonly known as:  [facility] Take 2 Tabs by mouth daily. cyanocobalamin 1,000 mcg/mL injection Commonly known as:  VITAMIN B12  
  
 DAILY MULTI-VITAMINS/IRON tablet Generic drug:  multivitamin with iron Take 1 Tab by mouth daily. diclofenac 1 % Gel Commonly known as:  VOLTAREN Apply 2 g to affected area four (4) times daily. hydroCHLOROthiazide 25 mg tablet Commonly known as:  HYDRODIURIL Take 25 mg by mouth daily as needed. JARDIANCE 10 mg tablet Generic drug:  empagliflozin Take 10 mg by mouth daily. lisinopril 5 mg tablet Commonly known as:  Santos Herrera Take 5 mg by mouth daily. naltrexone-buPROPion 8-90 mg Tber ER tablet Commonly known as:  Ros Doyle Week 1 1 tab PO QAM, Week 2 1QAM 1QHS, Week 3 2QAM 1 QHS, Week 4 & beyond 2QAM 2QHS  
  
 pantoprazole 40 mg tablet Commonly known as:  PROTONIX Take 40 mg by mouth daily. repaglinide 1 mg tablet Commonly known as:  Savanna Ao Take 1 mg by mouth Before breakfast, lunch, and dinner. TRULICITY 3.56 EW/0.4 mL sub-q pen Generic drug:  dulaglutide 0.75 mg by SubCUTAneous route every seven (7) days. Prescriptions Sent to Pharmacy Refills buPROPion (WELLBUTRIN) 75 mg tablet 4 Sig: Take 2 Tabs by mouth daily. Class: Normal  
 Pharmacy: Lois Flanagan [address] [place] [address] [place] 2017 [facility] #: [phone] Route: Oral  
  
Follow-up Instructions Return in about 2 weeks (around 4/19/2018). Introducing [facility] & [facility] SERVICES! Dear Betsy Pompa: 
Thank you for requesting a [employer] account. Our records indicate that you already have an active [employer] account. You can access your account anytime at [email or web address] Hired Did you know that you can access your hospital and ER discharge instructions at any time in [employer] You can also review all of your test results from your hospital stay or ER visit. Additional Information If you have questions, please visit the Frequently Asked Questions section of the [employer] website at [email or web address] Remember, [employer] is NOT to be used for urgent needs. For medical emergencies, dial 911. Now available from your iPhone and Android! Please provide this summary of care documentation to your next provider. Your primary care clinician is listed as Kya Benton. If you have any questions after today's visit, please call [phone].

## 2018-04-17 ENCOUNTER — OFFICE VISIT (OUTPATIENT)
Dept: BARIATRICS/WEIGHT MGMT | Age: 48
End: 2018-04-17

## 2018-04-17 DIAGNOSIS — E66.9 CLASS 1 OBESITY WITHOUT SERIOUS COMORBIDITY WITH BODY MASS INDEX (BMI) OF 32.0 TO 32.9 IN ADULT, UNSPECIFIED OBESITY TYPE: Primary | ICD-10-CM

## 2018-04-17 NOTE — PROGRESS NOTES
Patient attended a Medically Supervised Weight Loss New Patient Orientation today where we discussed:  - New Direction Very Low Calorie Diet details  - Medical Supervision  - Nutrition education  - Cost  - Policies and compliance required for program enrollment. - Lab slip was given to patient with instructions for having them drawn. Patients initial consultation with physician is tentatively scheduled for:  Future Appointments  Date Time Provider Isidoro Aguilar   4/30/2018 1:30 PM Veronica Penaloza MD [facility] starting weight was documented as: There were no vitals taken for this visit. Patient attended a Medically Supervised Weight Loss New Patient Orientation today where we discussed:  - New Direction Very Low Calorie Diet details  - Medical Supervision  - Nutrition education  - Cost  - Policies and compliance required for program enrollment. - Lab slip was given to patient with instructions for having them drawn.   Patients initial consultation with physician is tentatively scheduled for:  Future Appointments  Date Time Provider Isidoro Aguilar   4/30/2018 1:30 PM Veronica Penaloza MD [address] [facility]

## 2018-04-20 LAB
CREATININE, EXTERNAL: 1
HBA1C MFR BLD HPLC: 8.3 %
LDL-C, EXTERNAL: 191

## 2018-06-04 ENCOUNTER — OFFICE VISIT (OUTPATIENT)
Dept: FAMILY MEDICINE CLINIC | Age: 48
End: 2018-06-04

## 2018-06-04 VITALS
HEART RATE: 95 BPM | HEIGHT: 65 IN | RESPIRATION RATE: 19 BRPM | SYSTOLIC BLOOD PRESSURE: 133 MMHG | OXYGEN SATURATION: 98 % | TEMPERATURE: 98.1 F | DIASTOLIC BLOOD PRESSURE: 86 MMHG | WEIGHT: 192.7 LBS | BODY MASS INDEX: 32.11 KG/M2

## 2018-06-04 DIAGNOSIS — R40.0 DAYTIME SOMNOLENCE: ICD-10-CM

## 2018-06-04 DIAGNOSIS — R06.83 SNORING: Primary | ICD-10-CM

## 2018-06-04 DIAGNOSIS — Z13.6 SCREENING FOR ISCHEMIC HEART DISEASE: ICD-10-CM

## 2018-06-04 DIAGNOSIS — I10 ESSENTIAL HYPERTENSION, BENIGN: ICD-10-CM

## 2018-06-04 NOTE — MR AVS SNAPSHOT
[address] [address] 
[phone] Patient: Yovana Salas 
MRN: [id number] PIJ:3/75/9918 Visit Information Date & Time Provider Department Dept. Phone Encounter #  
 6/4/2018  8:45 AM Giselle Saldivar MD Bill Lea  [phone] [id number] Follow-up Instructions Return in about 1 month (around 7/4/2018). Upcoming Health Maintenance Date Due  
 FOOT EXAM Q1 2/23/1978 MICROALBUMIN Q1 2/23/1978 EYE EXAM RETINAL OR DILATED Q1 2/23/1978 Pneumococcal 19-64 Medium Risk (1 of 1 - PPSV23) 2/23/1987 DTaP/Tdap/Td series (1 - Tdap) 2/23/1989 HEMOGLOBIN A1C Q6M 5/5/2013 LIPID PANEL Q1 7/9/2013 PAP AKA CERVICAL CYTOLOGY 10/28/2016 BREAST CANCER SCRN MAMMOGRAM 2/23/2018 FOBT Q 1 YEAR AGE 50-75 2/23/2018 Influenza Age 5 to Adult 8/1/2018 Allergies as of 6/4/2018  Review Complete On: 6/4/2018 By: Giselle Saldivar MD  
  
 Severity Noted Reaction Type Reactions Reglan [Metoclopramide]  11/14/2011    Anxiety Simvastatin  03/21/2018    Myalgia, Other (comments) Causes abnormal liver enzymes Current Immunizations  Reviewed on 11/14/2011 Name Date Influenza Vaccine Split 11/14/2011 Not reviewed this visit You Were Diagnosed With   
  
 Codes Comments Snoring    -  Primary ICD-10-CM: R06.83 
ICD-9-CM: 786.09 Essential hypertension, benign     ICD-10-CM: I10 
ICD-9-CM: 401.1 Daytime somnolence     ICD-10-CM: R40.0 ICD-9-CM: 780.54 Uncontrolled type 2 diabetes mellitus without complication, without long-term current use of insulin ([facility]     ICD-10-CM: E11.65 ICD-9-CM: 250.02 Screening for ischemic heart disease     ICD-10-CM: Z13.6 ICD-9-CM: V81.0 Vitals BP Pulse Temp Resp Height(growth percentile) Weight(growth percentile) 133/86 95 98.1 °F (36.7 °C) (Oral) 19 5' 5\" (1.651 m) 192 lb 11.2 oz (87.4 kg) LMP SpO2 BMI OB Status Smoking Status 07/17/2015 98% 32.07 kg/m2 Hysterectomy Never Smoker Vitals History BMI and BSA Data Body Mass Index Body Surface Area 32.07 kg/m 2 2 m 2 Preferred Pharmacy Pharmacy Name Phone [facility] [phone] Your Updated Medication List  
  
   
This list is accurate as of 6/4/18 10:02 AM.  Always use your most recent med list.  
  
  
  
  
 buPROPion 75 mg tablet Commonly known as:  [facility] Take 2 Tabs by mouth daily. cyanocobalamin 1,000 mcg/mL injection Commonly known as:  VITAMIN B12  
  
 DAILY MULTI-VITAMINS/IRON tablet Generic drug:  multivitamin with iron Take 1 Tab by mouth daily. diclofenac 1 % Gel Commonly known as:  VOLTAREN Apply 2 g to affected area four (4) times daily. hydroCHLOROthiazide 25 mg tablet Commonly known as:  HYDRODIURIL Take 25 mg by mouth daily as needed. JARDIANCE 10 mg tablet Generic drug:  empagliflozin Take 10 mg by mouth daily. lisinopril 5 mg tablet Commonly known as:  Therisa Semen Take 5 mg by mouth daily. naltrexone-buPROPion 8-90 mg Tber ER tablet Commonly known as:  Pattie Javier Week 1 1 tab PO QAM, Week 2 1QAM 1QHS, Week 3 2QAM 1 QHS, Week 4 & beyond 2QAM 2QHS  
  
 pantoprazole 40 mg tablet Commonly known as:  PROTONIX Take 40 mg by mouth daily. repaglinide 1 mg tablet Commonly known as:  Willodean Ly Take 1 mg by mouth Before breakfast, lunch, and dinner. TRULICITY 4.04 CE/4.7 mL sub-q pen Generic drug:  dulaglutide 0.75 mg by SubCUTAneous route every seven (7) days. We Performed the Following AMB POC EKG ROUTINE W/ 12 LEADS, INTER & REP [91727 CPT(R)] SLEEP MEDICINE REFERRAL [LWL974 Custom] Comments:  
 Snoring, daytime somnolence 
eval and treat for NIKOLAS Follow-up Instructions Return in about 1 month (around 7/4/2018). Referral Information Referral ID Referred By Referred To  
  
 [id number] [facility] [address], [address] Visits Status Start Date End Date 1 New Request 6/4/18 6/4/19 If your referral has a status of pending review or denied, additional information will be sent to support the outcome of this decision. Introducing [facility] & HEALTH SERVICES! Dear Arline Reach: 
Thank you for requesting a Socket Mobile account. Our records indicate that you already have an active Socket Mobile account. You can access your account anytime at [email or web address] Did you know that you can access your hospital and ER discharge instructions at any time in Socket Mobile? You can also review all of your test results from your hospital stay or ER visit. Additional Information If you have questions, please visit the Frequently Asked Questions section of the Socket Mobile website at [email or web address] Remember, Socket Mobile is NOT to be used for urgent needs. For medical emergencies, dial 911. Now available from your iPhone and Android! Please provide this summary of care documentation to your next provider. Your primary care clinician is listed as Kamila Hu. If you have any questions after today's visit, please call [phone].

## 2018-06-04 NOTE — PROGRESS NOTES
[facility] Weight Loss Program Progress Note: Initial Physician Visit     Odessa Paez is a 46 y.o. female who is here for medical screening for entering the [facility] Loss Program.     CC:  Obesity  She is taking trulicity for diabetes. The a1c had gotten up to 9 on jardiance  Doing well on that  She is post GBP, no complications      Weight History  I personally reviewed the [place] completed by patient and scanned into media section of chart. It includes duration of their obesity, maximum weight, goal weight and weight gain time line (timing), all of which give the context of their obesity AND a Family History of their obesity. Is their Weight Loss Goal entered in to Comments? 155-160    Weight loss History  I personally reviewed the Medical Screening Nuha Story completed by the patient and scanned into media section of chart. It includes number of weight loss attempts, the weight loss program that patients was most successful using, and if they have any hx of anorectic medication use, including OTC supplements. This captures modifying factors. Significant Medical History  Past Medical History:   Diagnosis Date    Depression     Depression with anxiety     Diabetes ([facility]     Essential hypertension, benign 11/17/2010    History of ectopic pregnancy 8/27/2987    Hypercholesterolemia     Hypertension 1995    Ill-defined condition     sycope, states from new diabetic medication    MRSA infection (methicillin-resistant Staphylococcus aureus)     boil in groin culture came back with MRSA.  Nausea & vomiting     Nausea    Syncope and collapse 11/17/2010    Unspecified adverse effect of anesthesia     Had tachycardia       I personally reviewed the Medical Screening Martene Elm completed by the patient and scanned into media section of chart.   This allows me to assess associated symptoms that are significant in the assessment of the patient's obesity and the patient's Past Medical History. Outpatient Prescriptions Marked as Taking for the 6/4/18 encounter (Office Visit) with Nicole Blanchard MD   Medication Sig Dispense Refill    buPROPion [facility]) 75 mg tablet Take 2 Tabs by mouth daily. 60 Tab 4    diclofenac (VOLTAREN) 1 % gel Apply 2 g to affected area four (4) times daily. 1 Each 3    repaglinide (PRANDIN) 1 mg tablet Take 1 mg by mouth Before breakfast, lunch, and dinner.  TRULICITY 3.67 PD/7.5 mL sub-q pen 0.75 mg by SubCUTAneous route every seven (7) days.  pantoprazole (PROTONIX) 40 mg tablet Take 40 mg by mouth daily.  JARDIANCE 10 mg tablet Take 10 mg by mouth daily.  hydroCHLOROthiazide (HYDRODIURIL) 25 mg tablet Take 25 mg by mouth daily as needed.  lisinopril (PRINIVIL, ZESTRIL) 5 mg tablet Take 5 mg by mouth daily.  cyanocobalamin (VITAMIN B12) 1,000 mcg/mL injection       naltrexone-buPROPion (CONTRAVE) 8-90 mg TbER ER tablet Week 1 1 tab PO QAM, Week 2 1QAM 1QHS, Week 3 2QAM 1 QHS, Week 4 & beyond 2QAM 2QHS 120 Tab 11    multivitamin with iron (DAILY MULTI-VITAMINS/IRON) tablet Take 1 Tab by mouth daily. SLEEP: 7-7.5      Significant Psychosocial History   Has a doctor every diagnosed with Binge Eating Disorder, Bulemia or Anorexia? : no     Compliance  Upcoming Travel? no    Social History  Social History   Substance Use Topics    Smoking status: Never Smoker    Smokeless tobacco: Never Used    Alcohol use No      Comment: socially       Exercise  I personally reviewed the Medical Screening Les Lo completed by the patient and scanned into media section of chart.       Review of Systems  See HPI        Objective  Visit Vitals    /86    Pulse 95    Temp 98.1 °F (36.7 °C) (Oral)    Resp 19    Ht 5' 5\" (1.651 m)    Wt 192 lb 11.2 oz (87.4 kg)    LMP 07/17/2015    SpO2 98%    BMI 32.07 kg/m2         Weight Metrics 6/4/2018 6/4/2018 4/5/2018 4/5/2018 3/27/2018 3/27/2018 3/21/2018 Weight - 192 lb 11.2 oz - 196 lb - 193 lb 8 oz 197 lb   Neck Circ (inches) 14.5 - 14.5 - 14 - -   Waist Measure Inches 37.5 - 38 - 38.5 - -   Exercise Mins/week 90 - 0 - 120 - -   Body Fat % 38.9 - - - 39.1 - -   BMI - 32.07 kg/m2 - 32.62 kg/m2 - 32.2 kg/m2 32.78 kg/m2       Labs: See HDL labs scanned into Media section       Physical Exam    Vital Signs Reviewed  Weight Management Metrics Reviewed    Appearance: well  HEENT:  Scleral icterus?  no  Neck:  Thyromegaly or nodules? no  Mouth: Large tongue no  Heart:  rrr  Lungs:  clear  Abdomen:     Hepatomegaly? no   Striae present? no  Skin:    Acne?  no   Hirsutism? no   Skin tags? no   Acanthosis Nigricans?  no  Ext:  Edema? no      Assessment & Plan  Encounter Diagnoses   Name Primary?  Snoring Yes    Essential hypertension, benign     Daytime somnolence     Uncontrolled type 2 diabetes mellitus without complication, without long-term current use of insulin ([facility])     Screening for ischemic heart disease        1. HDL labs reviewed w/ patient  2. EKG reviewed w/ patient:   Personally reviewed by me, NSR, No abnormalities,    QTc = 394 sec (Upper limit is 440 for males & 460 for females)      3. Medication changes include: none    Based on his history, labs and EKG, Yudy Singh is  a good candidate for the [facility] Loss Program     25 min of the 45 minutes face to face time with Yudy consisted of counseling & coordinating and/or discussing treatment plans in reference to her obesity The primary encounter diagnosis was Snoring. Diagnoses of Essential hypertension, benign, Daytime somnolence, Uncontrolled type 2 diabetes mellitus without complication, without long-term current use of insulin ([facility] and Screening for ischemic heart disease were also pertinent to this visit.

## 2018-06-04 NOTE — PROGRESS NOTES
1. Have you been to the ER, urgent care clinic since your last visit? Hospitalized since your last visit? No    2. Have you seen or consulted any other health care providers outside of the [address] [place] [address] since your last visit? Include any pap smears or colon screening.  Dr. Adin Velazquez, psychology    Chief Complaint   Patient presents with   [facility] Weight Management     Body Weight: 192.7  Body Fat%: 38.9  Muscle Mass Weight: 31.7  Body Water Weight: 94.5  Basal Metabolic Rate: 0097  BMI: 32.07

## 2018-06-12 ENCOUNTER — CLINICAL SUPPORT (OUTPATIENT)
Dept: BARIATRICS/WEIGHT MGMT | Age: 48
End: 2018-06-12

## 2018-06-12 DIAGNOSIS — E66.9 CLASS 1 OBESITY WITHOUT SERIOUS COMORBIDITY WITH BODY MASS INDEX (BMI) OF 32.0 TO 32.9 IN ADULT, UNSPECIFIED OBESITY TYPE: Primary | ICD-10-CM

## 2018-06-12 DIAGNOSIS — I10 ESSENTIAL HYPERTENSION, BENIGN: ICD-10-CM

## 2018-06-13 VITALS
BODY MASS INDEX: 32.3 KG/M2 | SYSTOLIC BLOOD PRESSURE: 133 MMHG | HEART RATE: 93 BPM | HEIGHT: 65 IN | DIASTOLIC BLOOD PRESSURE: 79 MMHG | WEIGHT: 193.9 LBS

## 2018-06-13 NOTE — PROGRESS NOTES
Progress Note: Weekly Education Class in the [facility] Loss Program   Is there anything that you or the patient needs to let Dr Hilary Hansen know about? Patient states that the Contrave is not working and would like to know if it can be changed to something else. Over the past week, have you experienced any side-effects? Yes, headache, dizzy. Shy Harris is a 46 y.o. female who is enrolled in [facility]    Visit Vitals    /79    Pulse 93    Ht 5' 5\" (1.651 m)    Wt 193 lb 14.4 oz (88 kg)    LMP 07/17/2015    BMI 32.27 kg/m2     Weight Metrics 6/13/2018 6/12/2018 6/4/2018 6/4/2018 4/5/2018 4/5/2018 3/27/2018   Weight - 193 lb 14.4 oz - 192 lb 11.2 oz - 196 lb -   Neck Circ (inches) - - 14.5 - 14.5 - 14   Waist Measure Inches 37.5 - 37.5 - 38 - 38.5   Exercise Mins/week - - 90 - 0 - 120   Body Fat % - - 38.9 - - - 39.1   BMI - 32.27 kg/m2 - 32.07 kg/m2 - 32.62 kg/m2 -         Have you received any other medical care this week? no  If yes, where and for what? Have you had any change in your medications since your last visit? no  If yes what? Did you have any problems adhering to the program last week? yes  If yes, please explain:       Eating Habits Over Last Week:  Did you take in 64 oz of non-caloric fluids?  no     Did you consume your 4 meal replacements each day? no       Physical Activity Over the Past Week:    Aerobic exercise: 0 min  Resistance exercise: 0 workouts / week

## 2018-06-17 NOTE — PROGRESS NOTES
Nurse note from patient's weekly VLCD / LCD / Maintenance class was reviewed. Pertinent medical concerns were:        BP Readings from Last 3 Encounters:   06/13/18 133/79   06/04/18 133/86   04/05/18 121/81       Weight Metrics 6/13/2018 6/12/2018 6/4/2018 6/4/2018 4/5/2018 4/5/2018 3/27/2018   Weight - 193 lb 14.4 oz - 192 lb 11.2 oz - 196 lb -   Neck Circ (inches) - - 14.5 - 14.5 - 14   Waist Measure Inches 37.5 - 37.5 - 38 - 38.5   Exercise Mins/week - - 90 - 0 - 120   Body Fat % - - 38.9 - - - 39.1   BMI - 32.27 kg/m2 - 32.07 kg/m2 - 32.62 kg/m2 -       Current Outpatient Prescriptions   Medication Sig Dispense Refill    buPROPion (WELLBUTRIN) 75 mg tablet Take 2 Tabs by mouth daily. 60 Tab 4    diclofenac (VOLTAREN) 1 % gel Apply 2 g to affected area four (4) times daily. 1 Each 3    repaglinide (PRANDIN) 1 mg tablet Take 1 mg by mouth Before breakfast, lunch, and dinner.  TRULICITY 2.62 EU/2.0 mL sub-q pen 0.75 mg by SubCUTAneous route every seven (7) days.  pantoprazole (PROTONIX) 40 mg tablet Take 40 mg by mouth daily.  JARDIANCE 10 mg tablet Take 10 mg by mouth daily.  hydroCHLOROthiazide (HYDRODIURIL) 25 mg tablet Take 25 mg by mouth daily as needed.  lisinopril (PRINIVIL, ZESTRIL) 5 mg tablet Take 5 mg by mouth daily.  cyanocobalamin (VITAMIN B12) 1,000 mcg/mL injection       naltrexone-buPROPion (CONTRAVE) 8-90 mg TbER ER tablet Week 1 1 tab PO QAM, Week 2 1QAM 1QHS, Week 3 2QAM 1 QHS, Week 4 & beyond 2QAM 2QHS 120 Tab 11    multivitamin with iron (DAILY MULTI-VITAMINS/IRON) tablet Take 1 Tab by mouth daily.

## 2018-07-09 ENCOUNTER — OFFICE VISIT (OUTPATIENT)
Dept: FAMILY MEDICINE CLINIC | Age: 48
End: 2018-07-09

## 2018-07-09 VITALS
DIASTOLIC BLOOD PRESSURE: 89 MMHG | BODY MASS INDEX: 32.14 KG/M2 | OXYGEN SATURATION: 98 % | WEIGHT: 192.9 LBS | SYSTOLIC BLOOD PRESSURE: 117 MMHG | HEART RATE: 82 BPM | TEMPERATURE: 97.9 F | HEIGHT: 65 IN | RESPIRATION RATE: 19 BRPM

## 2018-07-09 DIAGNOSIS — I10 ESSENTIAL HYPERTENSION, BENIGN: Primary | ICD-10-CM

## 2018-07-09 DIAGNOSIS — E66.9 OBESITY (BMI 30-39.9): ICD-10-CM

## 2018-07-09 DIAGNOSIS — E11.9 TYPE 2 DIABETES MELLITUS TREATED WITHOUT INSULIN (HCC): ICD-10-CM

## 2018-07-09 RX ORDER — FINASTERIDE 5 MG/1
5 TABLET, FILM COATED ORAL DAILY
COMMUNITY
Start: 2018-06-21 | End: 2022-04-26

## 2018-07-09 RX ORDER — LISINOPRIL 5 MG/1
2.5 TABLET ORAL DAILY
Qty: 15 TAB | Refills: 2
Start: 2018-07-09 | End: 2018-10-04

## 2018-07-09 RX ORDER — PREDNISONE 20 MG/1
20 TABLET ORAL 3 TIMES DAILY
COMMUNITY
Start: 2018-07-08 | End: 2018-10-04 | Stop reason: ALTCHOICE

## 2018-07-09 NOTE — PROGRESS NOTES
1. Have you been to the ER, urgent care clinic since your last visit? Hospitalized since your last visit? No    2. Have you seen or consulted any other health care providers outside of the [address] [place] [address] since your last visit? Include any pap smears or colon screening.  No      Chief Complaint   Patient presents with    Weight Management     Body Weight: 192.9  Body Fat%: 39.0  Muscle Mass Weight: 31.8  Body Water Weight: 29.8  Basal Metabolic [id number]  BMI: 62.52

## 2018-07-09 NOTE — MR AVS SNAPSHOT
[address] [address] 
[phone] Patient: Melissa Nunn 
MRN: [id number] GMB:3/54/1117 Visit Information Date & Time Provider Department Dept. Phone Encounter #  
 7/9/2018 10:00 AM Hill Campoverde MD [address] [place] [address] [id number] Upcoming Health Maintenance Date Due  
 FOOT EXAM Q1 2/23/1978 MICROALBUMIN Q1 2/23/1978 EYE EXAM RETINAL OR DILATED Q1 2/23/1978 Pneumococcal 19-64 Medium Risk (1 of 1 - PPSV23) 2/23/1987 DTaP/Tdap/Td series (1 - Tdap) 2/23/1989 HEMOGLOBIN A1C Q6M 5/5/2013 LIPID PANEL Q1 7/9/2013 PAP AKA CERVICAL CYTOLOGY 10/28/2016 BREAST CANCER SCRN MAMMOGRAM 2/23/2018 FOBT Q 1 YEAR AGE 50-75 2/23/2018 Influenza Age 5 to Adult 8/1/2018 Allergies as of 7/9/2018  Review Complete On: 7/9/2018 By: Hill Campoverde MD  
  
 Severity Noted Reaction Type Reactions Reglan [Metoclopramide]  11/14/2011    Anxiety Simvastatin  03/21/2018    Myalgia, Other (comments) Causes abnormal liver enzymes Current Immunizations  Reviewed on 11/14/2011 Name Date Influenza Vaccine Split 11/14/2011 Not reviewed this visit You Were Diagnosed With   
  
 Codes Comments Essential hypertension, benign    -  Primary ICD-10-CM: I10 
ICD-9-CM: 401.1 Type 2 diabetes mellitus treated without insulin (HCC)     ICD-10-CM: E11.9 ICD-9-CM: 250.00 Obesity (BMI 30-39. 9)     ICD-10-CM: E66.9 ICD-9-CM: 278.00 Vitals BP Pulse Temp Resp Height(growth percentile) Weight(growth percentile) 117/89 82 97.9 °F (36.6 °C) (Oral) 19 5' 5\" (1.651 m) 192 lb 14.4 oz (87.5 kg) LMP SpO2 BMI OB Status Smoking Status 07/17/2015 98% 32.1 kg/m2 Hysterectomy Never Smoker Vitals History BMI and BSA Data Body Mass Index Body Surface Area  
 32.1 kg/m 2 2 m 2 Preferred Pharmacy Pharmacy Name Phone Simon Duty [address], [place] 2017 [facility] [phone] Your Updated Medication List  
  
   
This list is accurate as of 7/9/18 11:26 AM.  Always use your most recent med list.  
  
  
  
  
 buPROPion 75 mg tablet Commonly known as:  [facility] Take 2 Tabs by mouth daily. cyanocobalamin 1,000 mcg/mL injection Commonly known as:  VITAMIN B12  
  
 DAILY MULTI-VITAMINS/IRON tablet Generic drug:  multivitamin with iron Take 1 Tab by mouth daily. diclofenac 1 % Gel Commonly known as:  VOLTAREN Apply 2 g to affected area four (4) times daily. finasteride 5 mg tablet Commonly known as:  PROSCAR  
5 mg daily. hydroCHLOROthiazide 25 mg tablet Commonly known as:  HYDRODIURIL Take 25 mg by mouth daily as needed. JARDIANCE 10 mg tablet Generic drug:  empagliflozin Take 10 mg by mouth daily. lisinopril 5 mg tablet Commonly known as:  Corrine Shames Take 0.5 Tabs by mouth daily. naltrexone-buPROPion 8-90 mg Tber ER tablet Commonly known as:  Vilinda Boop Week 1 1 tab PO QAM, Week 2 1QAM 1QHS, Week 3 2QAM 1 QHS, Week 4 & beyond 2QAM 2QHS  
  
 pantoprazole 40 mg tablet Commonly known as:  PROTONIX Take 40 mg by mouth daily. predniSONE 20 mg tablet Commonly known as:  Sherry Chill Take 20 mg by mouth three (3) times daily. TRULICITY 0.60 LM/5.0 mL sub-q pen Generic drug:  dulaglutide 0.75 mg by SubCUTAneous route every seven (7) days. Introducing [facility] & HE[facility] SERVICES! Dear Zakiya Yamil: 
Thank you for requesting a Altocom account. Our records indicate that you already have an active Altocom account. You can access your account anytime at [email or web address] Did you know that you can access your hospital and ER discharge instructions at any time in Altocom? You can also review all of your test results from your hospital stay or ER visit. Additional Information If you have questions, please visit the Frequently Asked Questions section of the [employer] website at [email or web address] Remember, ClickTale is NOT to be used for urgent needs. For medical emergencies, dial 911. Now available from your iPhone and Android! Please provide this summary of care documentation to your next provider. Your primary care clinician is listed as Kiersten Sherman. If you have any questions after today's visit, please call [phone].

## 2018-07-09 NOTE — PROGRESS NOTES
Weight Loss Progress Note: follow up Physician Visit      Roxane Fisher is a 46 y.o. female with BMI  32.10 who is here for her follow up  Evaluation for the medical bariatric care. CC: I want to be healthier    Weight History  Current weight 192  and BMI is Body mass index is 32.1 kg/(m^2). Goal weight BMI 29  Highest weight 197  (See weight gain time line scanned into media section of chart)    She has stopped the MSWL liquids  She has stopped the jardiance because of nausea vomitting  She is taking trulicity, this is the only med she is taking for blood sugar  She has stopped prandin and fasting was 120 this morning. Theoplis Inyo highest was 150 and she had a snack that night in the middle of the night          Significant Medical History    Update on sleep apnea and  CPAP avg 6 hours, has sleep study aug 9th, will be reschduling due to vacation    Ever had bariatric surgery: no    Pregnant or planning on becoming pregnant w/in 6 months: no       Significant Psychosocial History   Any history of drug abuse or dependence: no  Current Major Lifestyle Changes: no  Any potential unsupportive people: no          Social History  Social History   Substance Use Topics    Smoking status: Never Smoker    Smokeless tobacco: Never Used    Alcohol use No      Comment: socially     How many times a week do you eat out? 3    Do you drink any EtOH?  no   If so, how much? Do you have upcoming any travel in the next 6 weeks?  no   If so, what do you have planned? Exercise  How many days a week do you currently exercise?   2 miles most days of the week at least 5 days  Have you ever been told by a physician not to exercise?  no      Objective  Visit Vitals    /89    Pulse 82    Temp 97.9 °F (36.6 °C) (Oral)    Resp 19    Ht 5' 5\" (1.651 m)    Wt 192 lb 14.4 oz (87.5 kg)    LMP 07/17/2015    SpO2 98%    BMI 32.1 kg/m2       Bicep - (right ) circumference  Waist Circumference: See Weight Management Doc Flowsheet  Neck Circumference: See Weight Management Doc Flowsheet  Percent Body Fat: See Weight Management Doc Flowsheet  Weight Metrics 7/9/2018 7/9/2018 6/13/2018 6/12/2018 6/4/2018 6/4/2018 4/5/2018   Weight - 192 lb 14.4 oz - 193 lb 14.4 oz - 192 lb 11.2 oz -   Neck Circ (inches) 14 - - - 14.5 - 14.5   Waist Measure Inches 36 - 37.5 - 37.5 - 38   Exercise Mins/week - - - - 90 - 0   Body Fat % 39 - - - 38.9 - -   BMI - 32.1 kg/m2 - 32.27 kg/m2 - 32.07 kg/m2 -         Labs:     Review of Systems  Complete ROS negative except where noted above      Physical Exam    Vital Signs Reviewed  Weight Management Metrics Reviewed    Vital Signs Reviewed  Appearance: Obese, A&O x 3, NAD  HEENT:  NC/AT, EOMI, PERRL, No scleral icterus, malampatti score:4  Skin:    Skin tags - no   Acanthosis Nigricans - no  Neck:  No nodes, thyromegaly   Heart:  RRR without M/R/G  Lungs:  CTAB, no rhonchi, rales, or wheezes with good air exchange   Abdomen:  Non-tender, pos bowel sounds; hepatomegaly -   Ext:  No C/C/E        Assessment & Plan  Diagnoses and all orders for this visit:    1. Essential hypertension, benign  -     lisinopril (PRINIVIL, ZESTRIL) 5 mg tablet; Take 0.5 Tabs by mouth daily. Decreased to 2.5 mg  2. Type 2 diabetes mellitus treated without insulin (HCC)  -     lisinopril (PRINIVIL, ZESTRIL) 5 mg tablet; Take 0.5 Tabs by mouth daily. Helps to preserve the kidney function, trying to keep it on   3. Obesity (BMI 30-39. 9)  Diet:go to the LCD, low carb    Activity: she goes 2 times a week to [facility] and uses the TM at home, aim for 150 mins a week minimum      Medication: cont trulicity and contrave 2 a day and go back to Dr Alexis Salvador to get stable on a process / plan for when she feels out of control  Her   tells her repeatedly to eat  4.  Hair loss  She is now on propecia  I do not think this is associated with the diet      Based on his history and exam, Yudy Serrano is a good candidate for the  [facility]         There are no Patient Instructions on file for this visit. Follow-up Disposition: Not on File    Over 50% of the 30 minutes face to face time with Yudy consisted of counseling & coordinating and/or discussing treatment plans in reference to her obesity The primary encounter diagnosis was Essential hypertension, benign. Diagnoses of Type 2 diabetes mellitus treated without insulin (HCC) and Obesity (BMI 30-39. 9) were also pertinent to this visit.   The patient verbalizes understanding and agrees with the plan of care    Patient has the advanced directives  booklet to review

## 2018-07-24 RX ORDER — SPIRONOLACTONE 50 MG/1
50 TABLET, FILM COATED ORAL DAILY
Qty: 90 TAB | Refills: 3 | Status: SHIPPED | OUTPATIENT
Start: 2018-07-24 | End: 2021-05-06 | Stop reason: ALTCHOICE

## 2018-08-28 RX ORDER — DULAGLUTIDE 0.75 MG/.5ML
0.75 INJECTION, SOLUTION SUBCUTANEOUS
Qty: 9 PEN | Refills: 3 | Status: SHIPPED | OUTPATIENT
Start: 2018-08-28 | End: 2022-04-26

## 2018-08-29 DIAGNOSIS — F32.A DEPRESSION, UNSPECIFIED DEPRESSION TYPE: ICD-10-CM

## 2018-09-04 ENCOUNTER — TELEPHONE (OUTPATIENT)
Dept: FAMILY MEDICINE CLINIC | Age: 48
End: 2018-09-04

## 2018-09-04 NOTE — TELEPHONE ENCOUNTER
----- Message from Moises Madison MD sent at 9/3/2018  9:39 PM EDT -----  She is supposed to be getting two contrave in am and 150 mg wellbutrin in pm  Please verify she is taking it the way I planned it

## 2018-09-04 NOTE — TELEPHONE ENCOUNTER
LVM for pt to contact our office back when available to clarify medications and insurance information for PA required.

## 2018-09-09 RX ORDER — BUPROPION HYDROCHLORIDE 75 MG/1
TABLET ORAL
Qty: 60 TAB | Refills: 3 | Status: SHIPPED | OUTPATIENT
Start: 2018-09-09 | End: 2018-10-02 | Stop reason: SDUPTHER

## 2018-09-14 ENCOUNTER — OFFICE VISIT (OUTPATIENT)
Dept: FAMILY MEDICINE CLINIC | Age: 48
End: 2018-09-14

## 2018-09-14 VITALS
BODY MASS INDEX: 33.66 KG/M2 | OXYGEN SATURATION: 99 % | SYSTOLIC BLOOD PRESSURE: 122 MMHG | RESPIRATION RATE: 17 BRPM | HEIGHT: 65 IN | TEMPERATURE: 98.8 F | WEIGHT: 202 LBS | HEART RATE: 94 BPM | DIASTOLIC BLOOD PRESSURE: 84 MMHG

## 2018-09-14 DIAGNOSIS — Z76.89 ESTABLISHING CARE WITH NEW DOCTOR, ENCOUNTER FOR: Primary | ICD-10-CM

## 2018-09-14 RX ORDER — PANTOPRAZOLE SODIUM 40 MG/1
40 TABLET, DELAYED RELEASE ORAL DAILY
Qty: 60 TAB | Refills: 3 | Status: SHIPPED | OUTPATIENT
Start: 2018-09-14 | End: 2019-05-16 | Stop reason: SDUPTHER

## 2018-09-14 NOTE — PROGRESS NOTES
Kunal Srivastava is an 46 y.o. female who presents to establish care. Patient was previously receiving care at: Dr Ruby office Currently not having any complaints She has a past medical history of DM2, Depression, Vitamin B12 deficiency s/p gastric bypass. Diabetes Mellitus Type 2 The patient was diagnosed with DM2 over 20 years ago. She has tried several different medications and is currently on Trulicity 0.77IO. She reports her fasting blood sugars are often in the 120s and her latest HbA1c 3 months ago was ~8%. She is on HCTZ 25mg. She discontinued Lisinopril a couple months ago due to her concern for hair loss. Her previous PCP started her on a weight loss program but she is no longer adhering to that and will continue her weight loss journey at the [facility] gym and by watching her diet. She is scheduled to see an Endocrinologist for her diabetes in mid October. Depression and Anxiety The patient was on Effexor for several years and was abruptly discontinued within the past year. She was placed on Wellbutrin 75mg BID and Contrave 8-90mg QID. The Contrave was prescribed to assist in her weight loss but has Buproprion in the formulation. She would like to discontinue the Contrave and feels like Wellbutrin will control her mood symptoms well. Hair Loss The patient began to experience hair loss 3-4 months ago and was told by another doctor that this was as a result of her Lisinopril. She was started on Spirinolactone by a dermatologist (she does not see this doctor anymore) and Finasteride 5mg. She has noticed a slight improvement in her hair loss. Vitamin B12 deficiency The patient was diagnosed with a Vit B12 deficiency following her gastric bypass in 2005. She takes a Vit B12 injection once a month Hyperlipidemia The patient reports a history of hyperlipidemia.  She has tried several medications including statins which increased her liver function levels and she developed myalgias. She will not use a statin again. She would like to consider Repatha. Gyn Care LMP: Hysterectomy 2015 Last Pap Smear: 2015/2016, no abnormalities per patient Last Mammogram: 2012 Health Maintenance Colonoscopy: will schedule with Dr Uriel Rey Review of Systems History obtained from chart review and the patient General ROS: negative for - chills, fever or malaise Respiratory ROS: no cough, shortness of breath, or wheezing Cardiovascular ROS: no chest pain or dyspnea on exertion Gastrointestinal ROS: no abdominal pain, change in bowel habits, or black or bloody stools Genito-Urinary ROS: no dysuria, trouble voiding, or hematuria Musculoskeletal ROS: negative 
negative for - gait disturbance, joint pain or muscular weakness Neurological ROS: negative for - bowel and bladder control changes, dizziness, gait disturbance, numbness/tingling or visual changes Current medications include:  
Current Outpatient Prescriptions Medication Sig  
 buPROPion (WELLBUTRIN) 75 mg tablet TAKE TWO TABLETS BY MOUTH DAILY  TRULICITY 9.43 IU/7.2 mL sub-q pen 0.5 mL by SubCUTAneous route every seven (7) days.  spironolactone (ALDACTONE) 50 mg tablet Take 1 Tab by mouth daily.  finasteride (PROSCAR) 5 mg tablet 5 mg daily.  diclofenac (VOLTAREN) 1 % gel Apply 2 g to affected area four (4) times daily.  pantoprazole (PROTONIX) 40 mg tablet Take 40 mg by mouth daily.  hydroCHLOROthiazide (HYDRODIURIL) 25 mg tablet Take 25 mg by mouth daily as needed.  cyanocobalamin (VITAMIN B12) 1,000 mcg/mL injection  multivitamin with iron (DAILY MULTI-VITAMINS/IRON) tablet Take 1 Tab by mouth daily.  predniSONE (DELTASONE) 20 mg tablet Take 20 mg by mouth three (3) times daily.  lisinopril (PRINIVIL, ZESTRIL) 5 mg tablet Take 0.5 Tabs by mouth daily.  JARDIANCE 10 mg tablet Take 10 mg by mouth daily.  naltrexone-buPROPion (CONTRAVE) 8-90 mg TbER ER tablet Week 1 1 tab PO QAM, Week 2 1QAM 1QHS, Week 3 2QAM 1 QHS, Week 4 & beyond 2QAM 2QHS No current facility-administered medications for this visit. Allergies Allergies Allergen Reactions  Reglan [Metoclopramide] Anxiety  Simvastatin Myalgia and Other (comments) Causes abnormal liver enzymes Past Medical History Past Medical History:  
Diagnosis Date  Depression  Depression with anxiety  Diabetes ([facility] [address]  Essential hypertension, benign 11/17/2010  
 History of ectopic pregnancy 8/27/2987  Hypercholesterolemia  Hypertension 1995  Ill-defined condition   
 sycope, states from new diabetic medication  MRSA infection (methicillin-resistant Staphylococcus aureus) boil in groin culture came back with MRSA.  Nausea & vomiting Nausea  Syncope and collapse 11/17/2010  Unspecified adverse effect of anesthesia Had tachycardia Past Surgical History Past Surgical History:  
Procedure Laterality Date [address] UNLISTED  HX GASTRIC BYPASS  2005  HX GASTRIC BYPASS  2007 revision of gastric bypass  HX GYN  [id number]  
 endometrial ablation  HX ORTHOPAEDIC  1984  
 r knee scope removal of extra bone.  HX OTHER SURGICAL  2009  
 tummy tuck  HX TUBAL LIGATION  2008  
 tubal ligation Family History Family History Problem Relation Age of Onset  Hypertension Mother  Diabetes Mother  Depression Mother  Hypertension Father  Stroke Father TIA  Cancer Brother   
  lung cancer  Diabetes Sister  Hypertension Sister  Depression Sister No FH of breast cancer: No 
No FH of colon cancer: No  
Mother/Aunt/Uncle: ALS Social History Social History Social History  Marital status:  Spouse name: N/A  
 Number of children: N/A  
 Years of education: N/A Occupational History  Not on file. Social History Main Topics  Smoking status: Never Smoker  Smokeless tobacco: Never Used  Alcohol use No  
   Comment: socially  Drug use: No  
 Sexual activity: Not on file Other Topics Concern  Not on file Social History Narrative Immunizations Immunization History Administered Date(s) Administered  Influenza Vaccine Split 11/14/2011 Objective Vitals Visit Vitals  /84  Pulse 94  Temp 98.8 °F (37.1 °C) (Oral)  Resp 17  Ht 5' 5\" (1.651 m)  Wt 202 lb (91.6 kg)  LMP 07/17/2015  SpO2 99%  BMI 33.61 kg/m2 Physical Exam 
General appearance - alert, well appearing, and in no distress Eyes - pupils equal and reactive, extraocular eye movements intact Ears - bilateral TM's and external ear canals normal 
Nose - normal and patent, no erythema, discharge or polyps Mouth - mucous membranes moist, pharynx normal without lesions Neck - supple, no significant adenopathy, thyroid exam: thyroid is normal in size without nodules or tenderness Chest - clear to auscultation, no wheezes, rales or rhonchi, symmetric air entry Heart - normal rate, regular rhythm, normal S1, S2, no murmurs, rubs, clicks or gallops Abdomen - soft, nontender, nondistended, no masses or organomegaly Neurological - alert, oriented, normal speech, no focal findings or movement disorder noted Musculoskeletal - no joint tenderness, deformity or swelling Extremities - peripheral pulses normal, no pedal edema, no clubbing or cyanosis Skin - normal coloration and turgor, no rashes, no suspicious skin lesions noted Assessment:  
Ms Aurora Zavala is a 46 y.o. female who presents today to establish care. Plan: 1. Establishing care with new doctor, encounter for · Counseled re: diet, exercise, healthy lifestyle · The patient will drop off a copy of her recent lab work for my review. If any follow up labs are needed I will order them then. · Discussed the patient's BMI with her. The patient has recently tried a weight loss program but has decided to discontinue it. She will return to exercising at the gym and I will follow up with her at her next appointment. · Mammogram ordered today · I ordered a prescription for Shingrix . Per Albino Tobias, the vaccine is on backorder but the patient can pick it up as soon as it is back in stock · Medication Changes · Stop taking: Contrave · Start taking Wellbutrin 75mg four times a day: 2 tablets in AM, 2 tablets in PM for one week · After one week, start taking Wellbutrin 75mg three times a day: 2 tablets in AM, 1 table in PM until follow up appointment at our clinic. Follow-up Disposition: 
Return in about 1 month (around 10/14/2018), or if symptoms worsen or fail to improve. I discussed the aforementioned diagnoses with the patient as well as the plan of care. I discussed this patient with Dr. Sachi Jacques (Attending Physician) Signed By:  Shawna Rod MD  
 Family Medicine Resident, PGY1

## 2018-09-14 NOTE — MR AVS SNAPSHOT
[address] 
[phone] Patient: Jose Moeller 
MRN: [id number] [id number] Visit Information Date & Time Provider Department Dept. Phone Encounter #  
 9/14/2018  9:00 AM Andre Mann, [address] [facility] [phone] [id number] Follow-up Instructions Return in about 1 year (around 9/14/2019), or if symptoms worsen or fail to improve. Your Appointments 10/19/2018  3:00 PM  
ACUTE CARE with Andre Mann MD  
[address]) Appt Note: f/u to DM  
 [address]  
[phone]  
  
   
 [address] [place] [address] Ian Cash [address] Upcoming Health Maintenance Date Due  
 FOOT EXAM Q1 2/23/1978 MICROALBUMIN Q1 2/23/1978 Pneumococcal 19-64 Medium Risk (1 of 1 - PPSV23) 2/23/1987 DTaP/Tdap/Td series (1 - Tdap) 2/23/1989 HEMOGLOBIN A1C Q6M 5/5/2013 LIPID PANEL Q1 7/9/2013 PAP AKA CERVICAL CYTOLOGY 10/28/2016 BREAST CANCER SCRN MAMMOGRAM 2/23/2018 FOBT Q 1 YEAR AGE 50-75 2/23/2018 Influenza Age 5 to Adult 8/1/2018 EYE EXAM RETINAL OR DILATED Q1 6/6/2019 Allergies as of 9/14/2018  Review Complete On: 9/14/2018 By: Fredy Leonard LPN Severity Noted Reaction Type Reactions Reglan [Metoclopramide]  11/14/2011    Anxiety Simvastatin  03/21/2018    Myalgia, Other (comments) Causes abnormal liver enzymes Current Immunizations  Reviewed on 11/14/2011 Name Date Influenza Vaccine Split 11/14/2011 Not reviewed this visit Vitals BP Pulse Temp Resp Height(growth percentile) Weight(growth percentile) 122/84 94 98.8 °F (37.1 °C) (Oral) 17 5' 5\" (1.651 m) 202 lb (91.6 kg) LMP SpO2 BMI OB Status Smoking Status 07/17/2015 99% 33.61 kg/m2 Hysterectomy Never Smoker Vitals History BMI and BSA Data Body Mass Index Body Surface Area 33.61 kg/m 2 2.05 m 2 Preferred Pharmacy Pharmacy Name Phone Dipika Giraldo [phone] Your Updated Medication List  
  
   
This list is accurate as of 9/14/18 10:12 AM.  Always use your most recent med list.  
  
  
  
  
 buPROPion 75 mg tablet Commonly known as:  WELLBUTRIN  
TAKE TWO TABLETS BY MOUTH DAILY  
  
 cyanocobalamin 1,000 mcg/mL injection Commonly known as:  VITAMIN B12  
  
 DAILY MULTI-VITAMINS/IRON tablet Generic drug:  multivitamin with iron Take 1 Tab by mouth daily. diclofenac 1 % Gel Commonly known as:  VOLTAREN Apply 2 g to affected area four (4) times daily. finasteride 5 mg tablet Commonly known as:  PROSCAR  
5 mg daily. hydroCHLOROthiazide 25 mg tablet Commonly known as:  HYDRODIURIL Take 25 mg by mouth daily as needed. JARDIANCE 10 mg tablet Generic drug:  empagliflozin Take 10 mg by mouth daily. lisinopril 5 mg tablet Commonly known as:  Madan Bridges Take 0.5 Tabs by mouth daily. naltrexone-buPROPion 8-90 mg Tber ER tablet Commonly known as:  Billy Nares Week 1 1 tab PO QAM, Week 2 1QAM 1QHS, Week 3 2QAM 1 QHS, Week 4 & beyond 2QAM 2QHS  
  
 pantoprazole 40 mg tablet Commonly known as:  PROTONIX Take 40 mg by mouth daily. predniSONE 20 mg tablet Commonly known as:  Alinda Rosa M Take 20 mg by mouth three (3) times daily. spironolactone 50 mg tablet Commonly known as:  ALDACTONE Take 1 Tab by mouth daily. TRULICITY 9.54 OR/4.9 mL sub-q pen Generic drug:  dulaglutide 0.5 mL by SubCUTAneous route every seven (7) days. Follow-up Instructions Return in about 1 year (around 9/14/2019), or if symptoms worsen or fail to improve. Introducing [facility]! Dear Praveen Ann: 
Thank you for requesting a [employer] account.   Our records indicate that you already have an active [employer] account. You can access your account anytime at [email or web address]. [email or web address] Did you know that you can access your hospital and ER discharge instructions at any time in [employer]? You can also review all of your test results from your hospital stay or ER visit. Additional Information If you have questions, please visit the Frequently Asked Questions section of the [employer] website at [email or web address] Remember, [employer] is NOT to be used for urgent needs. For medical emergencies, dial 911. Now available from your iPhone and Android! Please provide this summary of care documentation to your next provider. Your primary care clinician is listed as Linda Mcneil. If you have any questions after today's visit, please call [phone].

## 2018-09-14 NOTE — PROGRESS NOTES
Chief Complaint Patient presents with Yovany More [facility] Care 1. Have you been to the ER, urgent care clinic since your last visit? Hospitalized since your last visit? No 
 
2. Have you seen or consulted any other health care providers outside of the [address] [place] [address] since your last visit? Include any pap smears or colon screening. No  
 
Mammogram--6 years ago--needs a referral 
 
Colonoscopy--has never had one Not fasting for labwork Had a sleep study yesterday-- Wants to discuss changing contrave--- 
 
Going to endocrinologist--next month for diabetes follow up--

## 2018-10-03 ENCOUNTER — TELEPHONE (OUTPATIENT)
Dept: FAMILY MEDICINE CLINIC | Age: 48
End: 2018-10-03

## 2018-10-03 DIAGNOSIS — E11.9 CONTROLLED TYPE 2 DIABETES MELLITUS WITHOUT COMPLICATION, WITHOUT LONG-TERM CURRENT USE OF INSULIN (HCC): Primary | ICD-10-CM

## 2018-10-04 ENCOUNTER — OFFICE VISIT (OUTPATIENT)
Dept: FAMILY MEDICINE CLINIC | Age: 48
End: 2018-10-04

## 2018-10-04 VITALS
HEIGHT: 65 IN | TEMPERATURE: 98.3 F | BODY MASS INDEX: 33.32 KG/M2 | DIASTOLIC BLOOD PRESSURE: 80 MMHG | OXYGEN SATURATION: 97 % | SYSTOLIC BLOOD PRESSURE: 113 MMHG | HEART RATE: 93 BPM | RESPIRATION RATE: 16 BRPM | WEIGHT: 200 LBS

## 2018-10-04 DIAGNOSIS — E78.2 MIXED HYPERLIPIDEMIA: ICD-10-CM

## 2018-10-04 DIAGNOSIS — E66.9 OBESITY (BMI 30-39.9): ICD-10-CM

## 2018-10-04 DIAGNOSIS — E11.9 TYPE 2 DIABETES MELLITUS TREATED WITHOUT INSULIN (HCC): Primary | ICD-10-CM

## 2018-10-04 DIAGNOSIS — I10 ESSENTIAL HYPERTENSION, BENIGN: ICD-10-CM

## 2018-10-04 LAB — GLUCOSE DOSE-GTT, POCT, GLDSPOCT: 223

## 2018-10-04 RX ORDER — DIAPER,BRIEF,INFANT-TODD,DISP
EACH MISCELLANEOUS
COMMUNITY
End: 2022-04-26

## 2018-10-04 RX ORDER — GLIPIZIDE 5 MG/1
5 TABLET ORAL 2 TIMES DAILY
Qty: 90 TAB | Refills: 0 | Status: SHIPPED | OUTPATIENT
Start: 2018-10-04 | End: 2018-11-15 | Stop reason: SDUPTHER

## 2018-10-04 NOTE — PATIENT INSTRUCTIONS
Blood Glucose: About This Test  What is it? A blood glucose test measures the amount of a type of sugar, called glucose, in your blood. Several different types of blood glucose tests are used. · Fasting blood sugar (FBS) measures blood glucose after you have not eaten for at least 8 hours. It is often the first test done to check for prediabetes and diabetes. · Random blood sugar (RBS) measures blood glucose regardless of when you last ate. · Oral glucose tolerance test (OGTT) may be used to diagnose prediabetes and diabetes. This test is a series of blood glucose measurements taken after you drink a sweet liquid that contains glucose. This test is mostly used for pregnant women to check for gestational diabetes. Why is this test done? Blood glucose tests are done to:  · Check for diabetes. · See how well treatment for diabetes is working. The A1c is a different type of blood test used to diagnose diabetes. How can you prepare for the test?  Be sure to tell your doctor about all the nonprescription and prescription medicines and herbs or other supplements you take. There are many medicines and supplements that can affect the results of these tests. Fasting blood sugar (FBS)  For a fasting blood sugar test, do not eat or drink anything other than water for at least 8 hours before the blood sample is taken. If you have diabetes, you may be asked to wait until you have had your blood tested before taking your morning dose of insulin or diabetes medicine. Random blood sugar (RBS)  No special preparation is needed before having a random blood sugar test.  Oral glucose tolerance test  In the 3 days before this test, eat the way you are used to eating. In general, this means a well-balanced diet that contains at least 150 grams of carbohydrate a day. Good foods to eat are fruits, breads, cereals, grains, rice, crackers, potatoes, beans, and corn.   In the 8 hours right before the test, do not eat or drink anything (except water). Do not smoke, drink alcohol, or exercise. What happens during the test?  A health professional takes a sample of your blood. For a home glucose test, you may have a blood sample taken from your fingertip. This is done by:  · Pricking your finger with a small needle (lancet) to collect a drop of blood. · Placing the blood on a special test strip. · Putting the test strip into a device called a blood glucose meter. For the oral glucose tolerance test:  · A blood sample will be taken when you arrive. This is your fasting blood glucose value. It will be compared with other samples during the day. · You will drink a sweet glucose liquid. · Depending on the reason for this test, blood samples may be taken 1, 2, or 3 hours after you drink the glucose. Blood samples may also be taken as soon as 30 minutes after you drink the glucose. When should you call for help? Watch closely for changes in your health, and be sure to contact your doctor if you have any problems. Follow-up care is a key part of your treatment and safety. Be sure to make and go to all appointments, and call your doctor if you are having problems. It's also a good idea to keep a list of the medicines you take. Ask your doctor when you can expect to have your test results. Where can you learn more? Go to http://lázaro-any.info/. Enter X224 in the search box to learn more about \"Blood Glucose: About This Test.\"  Current as of: December 7, 2017  Content Version: 11.8  © 8588-8172 [employer]. Care instructions adapted under license by [employer] (which disclaims liability or warranty for this information). If you have questions about a medical condition or this instruction, always ask your healthcare professional. [address] any warranty or liability for your use of this information.

## 2018-10-04 NOTE — PROGRESS NOTES
Identified Patient with two Patient identifiers (Name and ). Two Patient Identifiers confirmed. Reviewed record in preparation for visit and have obtained necessary documentation. Chief Complaint   Patient presents with    Blood sugar problem     blood sugar 359 at 10:00am, fasting - usually not that high       Visit Vitals    /80 (BP 1 Location: Right arm, BP Patient Position: Sitting)    Pulse 93    Temp 98.3 °F (36.8 °C) (Oral)    Resp 16    Ht 5' 5\" (1.651 m)    Wt 200 lb (90.7 kg)    SpO2 97%    BMI 33.28 kg/m2       1. Have you been to the ER, urgent care clinic since your last visit? Hospitalized since your last visit? No    2. Have you seen or consulted any other health care providers outside of the [address] [place] [address] since your last visit? Include any pap smears or colon screening.  No

## 2018-10-04 NOTE — PROGRESS NOTES
Subjective:     Yudy Terrell is a 46 y.o. female who presents for elevated Blood sugar. Diabetic for 20 years. Has appt with Dr. Lindsey Walters endocrinologist on the 18th. Tried to be seen sooner but was unable to. Only on Trulicity once a week. Not on Metformin because of diarrhea ( does not want it any more)   taking medications as instructed, no medication side effects noted, no TIA's, no chest pain on exertion, no dyspnea on exertion, no swelling of ankles. Feels tired. BS has been climbing up associated with polyuria. Diet and Lifestyle: follows a diabetic diet regularly, exercises regularly, low carbs  AM glucoses:  200- 300, 359 this am.     BP readings outside office:  [address] [address], not taking the lisinopril because it makes her hair fall   not on cholesterol medication because it caused increased LFTs and myalgia. no diabetic retinopathy at last visit with ophtalmologist in May 2018    Review of Systems, additional:  A comprehensive review of systems was negative except for that written in the HPI. Patient Active Problem List   Diagnosis Code    Essential hypertension, benign I10    Syncope and collapse R55    DM (diabetes mellitus) ([facility] [address] E11.9    PMDD (premenstrual dysphoric disorder) F32.81    Depression F32.9    Vitamin B12 deficiency E53.8    Menorrhagia N92.0    Vulvar lesion N90.89    Epigastric pain R10.13    Moderate major depression (HCC) F32.1       Current Outpatient Prescriptions   Medication Sig    biotin 10,000 mcg cap Take  by mouth.  glipiZIDE (GLUCOTROL) 5 mg tablet Take 1 Tab by mouth two (2) times a day.  pantoprazole (PROTONIX) 40 mg tablet Take 1 Tab by mouth daily.  buPROPion (WELLBUTRIN) 75 mg tablet TAKE TWO TABLETS BY MOUTH DAILY    TRULICITY 7.32 ZA/3.5 mL sub-q pen 0.5 mL by SubCUTAneous route every seven (7) days.  spironolactone (ALDACTONE) 50 mg tablet Take 1 Tab by mouth daily.  finasteride (PROSCAR) 5 mg tablet 5 mg daily.     hydroCHLOROthiazide (HYDRODIURIL) 25 mg tablet Take 25 mg by mouth daily as needed.  cyanocobalamin (VITAMIN B12) 1,000 mcg/mL injection     multivitamin with iron (DAILY MULTI-VITAMINS/IRON) tablet Take 1 Tab by mouth daily. No current facility-administered medications for this visit. Allergies   Allergen Reactions    Reglan [Metoclopramide] Anxiety    Simvastatin Myalgia and Other (comments)     Causes abnormal liver enzymes           Past Medical History:   Diagnosis Date    Depression     Depression with anxiety     Diabetes ([facility] [address]     Essential hypertension, benign 11/17/2010    History of ectopic pregnancy 8/27/2987    Hypercholesterolemia     Hypertension 1995    Ill-defined condition     sycope, states from new diabetic medication    MRSA infection (methicillin-resistant Staphylococcus aureus)     boil in groin culture came back with MRSA.  Nausea & vomiting     Nausea    Syncope and collapse 11/17/2010    Unspecified adverse effect of anesthesia     Had tachycardia     Past Surgical History:   Procedure Laterality Date    ABDOMEN SURGERY PROC UNLISTED      HX GASTRIC BYPASS  2005    HX GASTRIC BYPASS  2007    revision of gastric bypass    HX GYN  2015    endometrial ablation    HX ORTHOPAEDIC  1984    r knee scope removal of extra bone.     HX OTHER SURGICAL  2009    tummy tuck    HX TUBAL LIGATION  2008    tubal ligation     Family History   Problem Relation Age of Onset    Hypertension Mother     Diabetes Mother     Depression Mother     Hypertension Father     Stroke Father      TIA    Cancer Brother      lung cancer    Diabetes Sister     Hypertension Sister     Depression Sister      Social History   Substance Use Topics    Smoking status: Never Smoker    Smokeless tobacco: Never Used    Alcohol use No      Comment: socially          Objective:     Visit Vitals    /80 (BP 1 Location: Right arm, BP Patient Position: Sitting)    Pulse 93    Temp 98.3 °F (36.8 °C) (Oral)    Resp 16    Ht 5' 5\" (1.651 m)    Wt 200 lb (90.7 kg)    LMP 07/17/2015    SpO2 97%    BMI 33.28 kg/m2     Body mass index is 33.28 kg/(m^2). Physical Exam  General appearance: alert, cooperative, no distress, appears stated age  Lungs: clear to auscultation bilaterally, no wheezes, no increased work of breathing  Heart: regular rate and rhythm, S1, S2 normal, no murmur, click, rub or gallop  Extremities: extremities normal, no cyanosis or edema  Skin: color, texture, turgor normal. No rashes or lesions  Neurologic: Alert and oriented, grossly normal exam. Normal coordination and gait  Feet:  Normal sensation      Lab results below reviewed at this visit:  Results for orders placed or performed in visit on 03/12/18   VITAMIN B12   Result Value Ref Range    Vitamin B12 1123 232 - 5361 pg/mL   METABOLIC PANEL, COMPREHENSIVE   Result Value Ref Range    Glucose 216 (H) 65 - 99 mg/dL    BUN 24 6 - 24 mg/dL    Creatinine 0.87 0.57 - 1.00 mg/dL    GFR est non-AA 78 >59 mL/min/1.73    GFR est AA 90 >59 mL/min/1.73    BUN/Creatinine ratio 28 (H) 9 - 23    Sodium 135 134 - 144 mmol/L    Potassium 3.6 3.5 - 5.2 mmol/L    Chloride 94 (L) 96 - 106 mmol/L    CO2 25 18 - 29 mmol/L    Calcium 9.5 8.7 - 10.2 mg/dL    Protein, total 7.8 6.0 - 8.5 g/dL    Albumin 4.3 3.5 - 5.5 g/dL    GLOBULIN, TOTAL 3.5 1.5 - 4.5 g/dL    A-G Ratio 1.2 1.2 - 2.2    Bilirubin, total 0.3 0.0 - 1.2 mg/dL    Alk.  phosphatase 126 (H) 39 - 117 IU/L    AST (SGOT) 15 0 - 40 IU/L    ALT (SGPT) 12 0 - 32 IU/L   VITAMIN D, 1, 25 DIHYDROXY   Result Value Ref Range    Calcitriol (Vit D 1, 25 di-OH) 71.0 19.9 - 79.3 pg/mL       Lab Results   Component Value Date/Time    Hemoglobin A1c 8.7 (H) 11/05/2012 02:56 PM       Lab Results   Component Value Date/Time    Sodium 135 03/12/2018 02:37 PM    Potassium 3.6 03/12/2018 02:37 PM    Chloride 94 (L) 03/12/2018 02:37 PM    CO2 25 03/12/2018 02:37 PM    Anion gap 7 12/08/2009 11:35 AM Glucose 216 (H) 03/12/2018 02:37 PM    BUN 24 03/12/2018 02:37 PM    Creatinine 0.87 03/12/2018 02:37 PM    BUN/Creatinine ratio 28 (H) 03/12/2018 02:37 PM    GFR est AA 90 03/12/2018 02:37 PM    GFR est non-AA 78 03/12/2018 02:37 PM    Calcium 9.5 03/12/2018 02:37 PM           Lab Results   Component Value Date/Time    Cholesterol, total 203 (H) 07/09/2012 12:00 AM    HDL Cholesterol 51 07/09/2012 12:00 AM    LDL, calculated 122 (H) 07/09/2012 12:00 AM    VLDL, calculated 30 07/09/2012 12:00 AM    Triglyceride 148 07/09/2012 12:00 AM         Assessment/Plan:       ICD-10-CM ICD-9-CM    1. Type 2 diabetes mellitus treated without insulin (HCC) E11.9 250.00 AMB POC GLUCOSE TEST      LIPID PANEL      HEMOGLOBIN A1C WITH EAG      METABOLIC PANEL, COMPREHENSIVE      glipiZIDE (GLUCOTROL) 5 mg tablet      REFERRAL TO DIABETIC EDUCATION   2. Obesity (BMI 30-39. 9) E66.9 278.00 LIPID PANEL   3. Essential hypertension, benign W30 191.5 METABOLIC PANEL, COMPREHENSIVE   4. Mixed hyperlipidemia E78.2 272.2 LIPID PANEL       There are no diagnoses linked to this encounter. Diabetes: Discussed with patient today that the goal for their diabetes is to have a HgA1C<7 and ideally as close to 6.5 as possible.    Patient is aware of the need for yearly eye exams and to take care of their feet daily.    -POC glucose : 045  - continue Trulicicy, will start glipizide ( cannot tolerate Metformin)  - discussed benefits of ASA, statins,  and ACEIs. Consider PNA shot next visit  - follow up with endocrinologist.     Hyperlipidemia:   - not on statin ( as in HPI). Will check lipid panel    Hypertension:   - continue current meds. Advised the patient to meassure blood pressure at home and to bring logs at the next visit. Advised to be compliant with BP medication. DASH diet - includes fruits, vegetables, fiber, and less than 2000 mg sodium (salt) daily.      Has had bariatric surgery for weight control and needs her vitamins.      We discussed exercise, low fat, low cholesterol, low carb diet, weight loss and medications compliance    Patient is counseled to return to the office if symptoms do not improve as expected.  Urgent consultation with the nearest Emergency Department is strongly recommended if condition worsens.  Patient is counseled to follow up as recommended and to inform the office if any changes in treatment are recommended.       Follow-up Disposition:  Return in about 1 week (around 10/11/2018), or if symptoms worsen or fail to improve.

## 2018-11-15 DIAGNOSIS — E11.9 TYPE 2 DIABETES MELLITUS TREATED WITHOUT INSULIN (HCC): ICD-10-CM

## 2018-11-15 RX ORDER — GLIPIZIDE 5 MG/1
TABLET ORAL
Qty: 90 TAB | Refills: 0 | Status: SHIPPED | OUTPATIENT
Start: 2018-11-15 | End: 2022-04-26

## 2019-04-01 ENCOUNTER — DOCUMENTATION ONLY (OUTPATIENT)
Dept: FAMILY MEDICINE CLINIC | Age: 49
End: 2019-04-01

## 2019-04-01 NOTE — PROGRESS NOTES
Patient seen by Dr. Junior Gutierrez (Endocrinology) on 11/29/2018. Plan  1. DM2: Trulicity 1.0GH SQ weekly, Farxiga 10mg daily . Goal blood glucose .    2. HLD: tried Pravastatin, developed muscle aches, switched to Ezetimide 10mg

## 2019-05-16 RX ORDER — PANTOPRAZOLE SODIUM 40 MG/1
TABLET, DELAYED RELEASE ORAL
Qty: 60 TAB | Refills: 2 | Status: SHIPPED | OUTPATIENT
Start: 2019-05-16 | End: 2019-11-20 | Stop reason: SDUPTHER

## 2019-07-18 ENCOUNTER — HOSPITAL ENCOUNTER (OUTPATIENT)
Dept: MRI IMAGING | Age: 49
Discharge: HOME | End: 2019-07-18
Attending: PHYSICAL MEDICINE & REHABILITATION
Payer: COMMERCIAL

## 2019-07-18 DIAGNOSIS — M54.12 RADICULOPATHY, CERVICAL REGION: ICD-10-CM

## 2019-07-18 DIAGNOSIS — M75.102 ROTATOR CUFF SYNDROME OF LEFT SHOULDER: ICD-10-CM

## 2019-07-18 DIAGNOSIS — M47.812 CERVICAL SPONDYLOSIS: ICD-10-CM

## 2019-07-18 PROCEDURE — 72141 MRI NECK SPINE W/O DYE: CPT

## 2019-08-10 RX ORDER — SPIRONOLACTONE 50 MG/1
TABLET, FILM COATED ORAL
Qty: 90 TAB | Refills: 2 | OUTPATIENT
Start: 2019-08-10

## 2019-08-11 NOTE — TELEPHONE ENCOUNTER
I have never seen this patient. Dr. Sarita Santizo last saw this patient. I sent this note to her.     KIRK

## 2019-08-14 NOTE — TELEPHONE ENCOUNTER
Spoke with the patient. Stated has transferred care to another new provider and she will contact that office.

## 2019-08-14 NOTE — TELEPHONE ENCOUNTER
Patient needs an appointment. Please call and schedule an appointment with any available provider. Thanks!

## 2019-09-10 ENCOUNTER — HOSPITAL ENCOUNTER (OUTPATIENT)
Dept: NON INVASIVE DIAGNOSTICS | Age: 49
Discharge: HOME | End: 2019-09-10
Payer: COMMERCIAL

## 2019-09-10 DIAGNOSIS — S46.012A STRAIN OF TENDON OF LEFT ROTATOR CUFF: ICD-10-CM

## 2019-09-10 DIAGNOSIS — M19.012 ARTHRITIS OF LEFT SHOULDER REGION: ICD-10-CM

## 2019-09-10 DIAGNOSIS — M75.42 ROTATOR CUFF IMPINGEMENT SYNDROME OF LEFT SHOULDER: ICD-10-CM

## 2019-09-10 LAB
ATRIAL RATE: 73 BPM
CALCULATED P AXIS, ECG09: 46 DEGREES
CALCULATED R AXIS, ECG10: 26 DEGREES
CALCULATED T AXIS, ECG11: 35 DEGREES
DIAGNOSIS, 93000: NORMAL
P-R INTERVAL, ECG05: 132 MS
Q-T INTERVAL, ECG07: 400 MS
QRS DURATION, ECG06: 82 MS
QTC CALCULATION (BEZET), ECG08: 440 MS
VENTRICULAR RATE, ECG03: 73 BPM

## 2019-09-10 PROCEDURE — 93005 ELECTROCARDIOGRAM TRACING: CPT

## 2019-09-11 LAB
BUN SERPL-MCNC: 20 MG/DL (ref 6–24)
BUN/CREAT SERPL: 24 (ref 9–23)
CALCIUM SERPL-MCNC: 9.4 MG/DL (ref 8.7–10.2)
CHLORIDE SERPL-SCNC: 97 MMOL/L (ref 96–106)
CO2 SERPL-SCNC: 27 MMOL/L (ref 20–29)
CREAT SERPL-MCNC: 0.83 MG/DL (ref 0.57–1)
GLUCOSE SERPL-MCNC: 118 MG/DL (ref 65–99)
POTASSIUM SERPL-SCNC: 4.4 MMOL/L (ref 3.5–5.2)
SODIUM SERPL-SCNC: 138 MMOL/L (ref 134–144)

## 2019-09-11 NOTE — PROGRESS NOTES
Spoke to Marisol Peterson and informed that pt is NOT a pt of this practice AND Dr. Mukesh Laguerre did NOT order this test for the pt. Rachel Alvarez will correct the issue.

## 2019-09-13 LAB
BUN SERPL-MCNC: NORMAL MG/DL
CALCIUM SERPL-MCNC: NORMAL MG/DL
CHLORIDE SERPL-SCNC: NORMAL MMOL/L
CO2 SERPL-SCNC: NORMAL MMOL/L
CREAT SERPL-MCNC: NORMAL MG/DL
GLUCOSE SERPL-MCNC: NORMAL MG/DL
POTASSIUM SERPL-SCNC: NORMAL MMOL/L
SODIUM SERPL-SCNC: NORMAL MMOL/L

## 2019-11-20 RX ORDER — PANTOPRAZOLE SODIUM 40 MG/1
TABLET, DELAYED RELEASE ORAL
Qty: 60 TAB | Refills: 1 | Status: SHIPPED | OUTPATIENT
Start: 2019-11-20 | End: 2020-04-03

## 2020-04-03 RX ORDER — PANTOPRAZOLE SODIUM 40 MG/1
TABLET, DELAYED RELEASE ORAL
Qty: 60 TAB | Refills: 0 | Status: SHIPPED | OUTPATIENT
Start: 2020-04-03

## 2020-06-10 ENCOUNTER — HOSPITAL ENCOUNTER (OUTPATIENT)
Dept: ULTRASOUND IMAGING | Age: 50
Discharge: HOME | End: 2020-06-10
Attending: INTERNAL MEDICINE
Payer: COMMERCIAL

## 2020-06-10 DIAGNOSIS — R74.8 ELEVATED LIVER ENZYMES: ICD-10-CM

## 2020-06-10 PROCEDURE — 76700 US EXAM ABDOM COMPLETE: CPT

## 2020-12-23 ENCOUNTER — APPOINTMENT (OUTPATIENT)
Dept: CT IMAGING | Age: 50
End: 2020-12-23
Attending: NURSE PRACTITIONER
Payer: COMMERCIAL

## 2020-12-23 ENCOUNTER — HOSPITAL ENCOUNTER (EMERGENCY)
Age: 50
Discharge: HOME | End: 2020-12-23
Attending: EMERGENCY MEDICINE
Payer: COMMERCIAL

## 2020-12-23 VITALS
DIASTOLIC BLOOD PRESSURE: 72 MMHG | RESPIRATION RATE: 18 BRPM | TEMPERATURE: 97.7 F | HEART RATE: 67 BPM | BODY MASS INDEX: 26.26 KG/M2 | WEIGHT: 157.63 LBS | SYSTOLIC BLOOD PRESSURE: 118 MMHG | HEIGHT: 65 IN | OXYGEN SATURATION: 100 %

## 2020-12-23 DIAGNOSIS — R14.1 GAS PAIN: ICD-10-CM

## 2020-12-23 DIAGNOSIS — K59.00 CONSTIPATION, UNSPECIFIED CONSTIPATION TYPE: Primary | ICD-10-CM

## 2020-12-23 LAB
ALBUMIN SERPL-MCNC: 3.4 G/DL (ref 3.5–5)
ALBUMIN/GLOB SERPL: 0.9 {RATIO} (ref 1.1–2.2)
ALP SERPL-CCNC: 111 U/L (ref 45–117)
ALT SERPL-CCNC: 24 U/L (ref 12–78)
ANION GAP SERPL CALC-SCNC: 4 MMOL/L (ref 5–15)
APPEARANCE UR: CLEAR
AST SERPL-CCNC: 23 U/L (ref 15–37)
BACTERIA URNS QL MICRO: NEGATIVE /HPF
BASOPHILS # BLD: 0.1 K/UL (ref 0–0.1)
BASOPHILS NFR BLD: 2 % (ref 0–1)
BILIRUB SERPL-MCNC: 0.6 MG/DL (ref 0.2–1)
BILIRUB UR QL: NEGATIVE
BUN SERPL-MCNC: 15 MG/DL (ref 6–20)
BUN/CREAT SERPL: 19 (ref 12–20)
CALCIUM SERPL-MCNC: 8.7 MG/DL (ref 8.5–10.1)
CHLORIDE SERPL-SCNC: 104 MMOL/L (ref 97–108)
CO2 SERPL-SCNC: 32 MMOL/L (ref 21–32)
COLOR UR: ABNORMAL
COMMENT, HOLDF: NORMAL
COMMENT, HOLDF: NORMAL
CREAT SERPL-MCNC: 0.79 MG/DL (ref 0.55–1.02)
DIFFERENTIAL METHOD BLD: ABNORMAL
EOSINOPHIL # BLD: 0.5 K/UL (ref 0–0.4)
EOSINOPHIL NFR BLD: 5 % (ref 0–7)
EPITH CASTS URNS QL MICRO: ABNORMAL /LPF
ERYTHROCYTE [DISTWIDTH] IN BLOOD BY AUTOMATED COUNT: 15 % (ref 11.5–14.5)
GLOBULIN SER CALC-MCNC: 3.6 G/DL (ref 2–4)
GLUCOSE SERPL-MCNC: 112 MG/DL (ref 65–100)
GLUCOSE UR STRIP.AUTO-MCNC: >1000 MG/DL
HCT VFR BLD AUTO: 41.3 % (ref 35–47)
HGB BLD-MCNC: 13.3 G/DL (ref 11.5–16)
HGB UR QL STRIP: NEGATIVE
IMM GRANULOCYTES # BLD AUTO: 0 K/UL (ref 0–0.04)
IMM GRANULOCYTES NFR BLD AUTO: 0 % (ref 0–0.5)
KETONES UR QL STRIP.AUTO: NEGATIVE MG/DL
LEUKOCYTE ESTERASE UR QL STRIP.AUTO: NEGATIVE
LIPASE SERPL-CCNC: 585 U/L (ref 73–393)
LYMPHOCYTES # BLD: 3.9 K/UL (ref 0.8–3.5)
LYMPHOCYTES NFR BLD: 42 % (ref 12–49)
MCH RBC QN AUTO: 27.3 PG (ref 26–34)
MCHC RBC AUTO-ENTMCNC: 32.2 G/DL (ref 30–36.5)
MCV RBC AUTO: 84.8 FL (ref 80–99)
MONOCYTES # BLD: 0.6 K/UL (ref 0–1)
MONOCYTES NFR BLD: 6 % (ref 5–13)
NEUTS SEG # BLD: 4.2 K/UL (ref 1.8–8)
NEUTS SEG NFR BLD: 45 % (ref 32–75)
NITRITE UR QL STRIP.AUTO: NEGATIVE
NRBC # BLD: 0 K/UL (ref 0–0.01)
NRBC BLD-RTO: 0 PER 100 WBC
PH UR STRIP: 6.5 [PH] (ref 5–8)
PLATELET # BLD AUTO: 356 K/UL (ref 150–400)
PMV BLD AUTO: 10.6 FL (ref 8.9–12.9)
POTASSIUM SERPL-SCNC: 3.1 MMOL/L (ref 3.5–5.1)
PROT SERPL-MCNC: 7 G/DL (ref 6.4–8.2)
PROT UR STRIP-MCNC: NEGATIVE MG/DL
RBC # BLD AUTO: 4.87 M/UL (ref 3.8–5.2)
RBC #/AREA URNS HPF: ABNORMAL /HPF (ref 0–5)
SAMPLES BEING HELD,HOLD: NORMAL
SAMPLES BEING HELD,HOLD: NORMAL
SODIUM SERPL-SCNC: 140 MMOL/L (ref 136–145)
SP GR UR REFRACTOMETRY: 1.02 (ref 1–1.03)
UROBILINOGEN UR QL STRIP.AUTO: 0.2 EU/DL (ref 0.2–1)
WBC # BLD AUTO: 9.4 K/UL (ref 3.6–11)
WBC URNS QL MICRO: ABNORMAL /HPF (ref 0–4)

## 2020-12-23 PROCEDURE — 85025 COMPLETE CBC W/AUTO DIFF WBC: CPT

## 2020-12-23 PROCEDURE — 80053 COMPREHEN METABOLIC PANEL: CPT

## 2020-12-23 PROCEDURE — 87635 SARS-COV-2 COVID-19 AMP PRB: CPT

## 2020-12-23 PROCEDURE — 83690 ASSAY OF LIPASE: CPT

## 2020-12-23 PROCEDURE — 74011000636 HC RX REV CODE- 636: Performed by: RADIOLOGY

## 2020-12-23 PROCEDURE — 74177 CT ABD & PELVIS W/CONTRAST: CPT

## 2020-12-23 PROCEDURE — 36415 COLL VENOUS BLD VENIPUNCTURE: CPT

## 2020-12-23 PROCEDURE — 99283 EMERGENCY DEPT VISIT LOW MDM: CPT

## 2020-12-23 PROCEDURE — 81001 URINALYSIS AUTO W/SCOPE: CPT

## 2020-12-23 RX ORDER — POLYETHYLENE GLYCOL 3350 17 G/17G
17 POWDER, FOR SOLUTION ORAL DAILY
Qty: 510 G | Refills: 0 | Status: SHIPPED | OUTPATIENT
Start: 2020-12-23 | End: 2022-04-26

## 2020-12-23 RX ADMIN — IOPAMIDOL 100 ML: 755 INJECTION, SOLUTION INTRAVENOUS at 15:52

## 2020-12-23 NOTE — Clinical Note
[address] 
[facility] EMERGENCY DEPT 
[address] [facility] [place] [facility] [id number] [phone] Work/School Note Date: 12/23/2020 To Whom It May concern: 
 
Yudy Serrano was evaulated by the following provider(s): 
Attending Provider: Elen Valencia MD 
Nurse Practitioner: Hansa Humphrey NP.   S[place] Oumou virus is suspected. Per the CDC guidelines we recommend home isolation until the following conditions are all met: 1. At least 10 days have passed since symptoms first appeared and 2. At least 24 hours have passed since last fever without the use of fever-reducing medications and 
3. Symptoms (e.g., cough, shortness of breath) have improved Sincerely, 
 
 
 
 
Lori Sims NP

## 2020-12-23 NOTE — ED PROVIDER NOTES
This is a 49-year-old female who presents ambulatory to the emergency room with complaints of abdominal pain. Patient states her abdominal pain started about 3 days ago and has been accompanied by nausea as well as diarrhea approximately 6 times today. Patient has tried Gas-X, Prilosec, Mylanta and ginger ale with continued abdominal pain. Called her PCP who referred her to the emergency room. 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 States there is an outbreak at the facility. Denies chest pain, shortness of breath. States mild dizziness. Denies any fever or chills. No cough, mild sore throat  There are no further complaints at this time. Alex Guzman MD  Past Medical History:  No date: Depression  No date: Depression with anxiety  No date: Diabetes ([address]  11/17/2010: Essential hypertension, benign  8/27/2987: History of ectopic pregnancy  No date: Hypercholesterolemia  1995: Hypertension  No date: Ill-defined condition      Comment:  sycope, states from new diabetic medication  No date: MRSA infection (methicillin-resistant Staphylococcus aureus)      Comment:  boil in groin culture came back with MRSA. No date: Nausea & vomiting      Comment:  Nausea  11/17/2010: Syncope and collapse  No date: Unspecified adverse effect of anesthesia      Comment:  Had tachycardia  Past Surgical History:  No date: ABDOMEN SURGERY PROC UNLISTED  2005: HX GASTRIC BYPASS  2007: HX GASTRIC BYPASS      Comment:  revision of gastric bypass  2015: HX GYN      Comment:  endometrial ablation  1984: HX ORTHOPAEDIC      Comment:  r knee scope removal of extra bone. 2009: HX OTHER SURGICAL      Comment:  tummy tuck  2008: HX TUBAL LIGATION      Comment:  tubal ligation    Yudy CHEPE Serrano was evaluated in the Emergency Department on 12/23/2020 for the symptoms described in the history of present illness.  He/she was evaluated in the context of the global COVID-19 pandemic, which necessitated consideration that the patient might be at risk for infection with the SARS-CoV-2 virus that causes COVID-19. Institutional protocols and algorithms that pertain to the evaluation of patients at risk for COVID-19 are in a state of rapid change based on information released by regulatory bodies including the CDC and federal and state organizations. These policies and algorithms were followed during the patient's care in the ED. Surrogate Decision Maker (Who do you want to make decisions for you in the event you are not able to?): Extended Emergency Contact Information  Primary Emergency Contact: Sonny Serrano   [address] Phone: [phone]  Relation: Spouse    Ventilation (Do you want to be intubated and mechanically ventilated?):  Yes    CPR (Do you want chest compressions and electricity in an attempt to restart your heart?): Yes             Past Medical History:   Diagnosis Date    Depression     Depression with anxiety     Diabetes ([facility] [address]     Essential hypertension, benign 11/17/2010    History of ectopic pregnancy 8/27/2987    Hypercholesterolemia     Hypertension 1995    Ill-defined condition     sycope, states from new diabetic medication    MRSA infection (methicillin-resistant Staphylococcus aureus)     boil in groin culture came back with MRSA.  Nausea & vomiting     Nausea    Syncope and collapse 11/17/2010    Unspecified adverse effect of anesthesia     Had tachycardia       Past Surgical History:   Procedure Laterality Date    ABDOMEN SURGERY PROC UNLISTED      HX GASTRIC BYPASS  2005    HX GASTRIC BYPASS  2007    revision of gastric bypass    HX GYN  2015    endometrial ablation    HX ORTHOPAEDIC  1984    r knee scope removal of extra bone.     HX OTHER SURGICAL  2009    tummy tuck    HX TUBAL LIGATION  2008    tubal ligation         Family History:   Problem Relation Age of Onset    Hypertension Mother     Diabetes Mother     Depression Mother    [facility] Hypertension Father     Stroke Father         TIA    Cancer Brother         lung cancer    Diabetes Sister     Hypertension Sister     Depression Sister        Social History     Socioeconomic History    Marital status:      Spouse name: Not on file    Number of children: Not on file    Years of education: Not on file    Highest education level: Not on file   Occupational History    Not on file   Social Needs    Financial resource strain: Not on file    Food insecurity     Worry: Not on file     Inability: Not on file   [employer] needs     Medical: Not on file     Non-medical: Not on file   Tobacco Use    Smoking status: Never Smoker    Smokeless tobacco: Never Used   Substance and Sexual Activity    Alcohol use: No     Comment: socially    Drug use: No    Sexual activity: Not on file   Lifestyle    Physical activity     Days per week: Not on file     Minutes per session: Not on file    Stress: Not on file   Relationships    Social connections     Talks on phone: Not on file     Gets together: Not on file     Attends Sabianism service: Not on file     Active member of club or organization: Not on file     Attends meetings of clubs or organizations: Not on file     Relationship status: Not on file    Intimate partner violence     Fear of current or ex partner: Not on file     Emotionally abused: Not on file     Physically abused: Not on file     Forced sexual activity: Not on file   Other Topics Concern    Not on file   Social History Narrative    Not on file         ALLERGIES: Reglan [metoclopramide] and Simvastatin    Review of Systems   Constitutional: Negative for appetite change, chills, diaphoresis, fatigue and fever. HENT: Positive for sore throat (mild). Negative for congestion, ear discharge, ear pain, sinus pressure, sinus pain and trouble swallowing. Eyes: Negative for photophobia, pain, redness and visual disturbance.    Respiratory: Negative for chest tightness, shortness of breath and wheezing. Cardiovascular: Negative for chest pain and palpitations. Gastrointestinal: Positive for abdominal pain, diarrhea and nausea. Negative for abdominal distention and vomiting. Endocrine: Negative. Genitourinary: Negative for difficulty urinating, flank pain, frequency and urgency. Musculoskeletal: Negative for back pain, neck pain and neck stiffness. Skin: Negative for color change, pallor, rash and wound. Allergic/Immunologic: Negative. Neurological: Positive for dizziness. Negative for speech difficulty, weakness and headaches. Hematological: Does not bruise/bleed easily. Psychiatric/Behavioral: Negative for behavioral problems. The patient is not nervous/anxious. Vitals:    12/23/20 1342   BP: (!) 147/93   Pulse: 85   Resp: 18   Temp: 97.7 °F (36.5 °C)   SpO2: 100%   Weight: 71.5 kg (157 lb 10.1 oz)   Height: 5' 5\" (1.651 m)            Physical Exam  Vitals signs and nursing note reviewed. Constitutional:       General: She is not in acute distress. Appearance: Normal appearance. She is well-developed. She is not ill-appearing. HENT:      Head: Normocephalic and atraumatic. Right Ear: External ear normal.      Left Ear: External ear normal.      Nose: Nose normal.      Mouth/Throat:      Mouth: Mucous membranes are moist.   Eyes:      General:         Right eye: No discharge. Left eye: No discharge. Conjunctiva/sclera: Conjunctivae normal.      Pupils: Pupils are equal, round, and reactive to light. Neck:      Musculoskeletal: Normal range of motion and neck supple. Vascular: No JVD. Trachea: No tracheal deviation. Cardiovascular:      Rate and Rhythm: Normal rate and regular rhythm. Pulses: Normal pulses. Heart sounds: Normal heart sounds. No murmur. No gallop. Pulmonary:      Effort: Pulmonary effort is normal. No respiratory distress. Breath sounds: Normal breath sounds. No wheezing or rales. Chest:      Chest wall: No tenderness. Abdominal:      General: Bowel sounds are normal. There is no distension. Palpations: Abdomen is soft. Tenderness: There is generalized abdominal tenderness. There is guarding. There is no rebound. Genitourinary:     Comments: Negative    Musculoskeletal: Normal range of motion. General: No tenderness. Skin:     General: Skin is warm and dry. Capillary Refill: Capillary refill takes less than 2 seconds. Coloration: Skin is not pale. Findings: No erythema or rash. Neurological:      General: No focal deficit present. Mental Status: She is alert and oriented to person, place, and time. Motor: No weakness. Coordination: Coordination normal.   Psychiatric:         Mood and Affect: Mood normal.         Behavior: Behavior normal.         Thought Content: Thought content normal.         Judgment: Judgment normal.          MDM  Number of Diagnoses or Management Options  Constipation, unspecified constipation type: new and requires workup  Gas pain: new and requires workup  Diagnosis management comments: Differential diagnosis includes constipation, urinary tract infection, COVID-19 virus and others. After physical examination and review of laboratory data and imaging, patient was discharged home and will follow up with PCP. On quarantine until COVID-19 lab resulted. Return to the emergency room with worsening symptoms. Patient in agreement with plan of care. Amount and/or Complexity of Data Reviewed  Clinical lab tests: reviewed and ordered  Tests in the radiology section of CPT®: reviewed and ordered         Labs Reviewed   CBC WITH AUTOMATED DIFF - Abnormal; Notable for the following components:       Result Value    RDW 15.0 (*)     BASOPHILS 2 (*)     ABS. LYMPHOCYTES 3.9 (*)     ABS.  EOSINOPHILS 0.5 (*)     All other components within normal limits   METABOLIC PANEL, COMPREHENSIVE - Abnormal; Notable for the following components:    Potassium 3.1 (*)     Anion gap 4 (*)     Glucose 112 (*)     Albumin 3.4 (*)     A-G Ratio 0.9 (*)     All other components within normal limits   LIPASE - Abnormal; Notable for the following components:    Lipase 585 (*)     All other components within normal limits   URINALYSIS W/MICROSCOPIC - Abnormal; Notable for the following components:    Glucose >1,000 (*)     All other components within normal limits   SAMPLES BEING HELD   SAMPLES BEING HELD     Ct Abd Pelv W Cont    Result Date: 12/23/2020  IMPRESSION: No acute finding. No significant change. 6:14 PM  Pt has been reexamined. Pt has no new complaints, changes or physical findings. Care plan outlined and precautions discussed. All available results were reviewed with pt. All medications were reviewed with pt. All of pt's questions and concerns were addressed. Pt agrees to F/U as instructed and agrees to return to ED upon further deterioration. Pt is ready to go home.   Ayla Gonzalez NP      Procedures

## 2020-12-23 NOTE — ED TRIAGE NOTES
Pt reports abdominal pain onset about 3 days ago accompanied by diarrhea and nausea. Tried Gas-x, ginger ale, and mylanta without relief. Called PCP and referred to the ED for further evaluation. 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

## 2020-12-23 NOTE — ED NOTES
Pt discharged with written instructions and has verbalized understanding. Ambulatory out of the department with a steady gait and in no acute distress.

## 2020-12-24 ENCOUNTER — PATIENT OUTREACH (OUTPATIENT)
Dept: CASE MANAGEMENT | Age: 50
End: 2020-12-24

## 2020-12-24 LAB
COVID-19, XGCOVT: NOT DETECTED
HEALTH STATUS, XMCV2T: NORMAL
SOURCE, COVRS: NORMAL
SPECIMEN SOURCE, FCOV2M: NORMAL
SPECIMEN TYPE, XMCV1T: NORMAL

## 2020-12-24 NOTE — PROGRESS NOTES
Called pt to follow up on ED visit to [facility] on 12/23/2020. LVM stating my name, CTN calling on behalf of [employer], date and time of call and my return telephone number. [employer] message sent.

## 2021-01-07 ENCOUNTER — PATIENT OUTREACH (OUTPATIENT)
Dept: CASE MANAGEMENT | Age: 51
End: 2021-01-07

## 2021-01-07 NOTE — PROGRESS NOTES
Patient resolved from Transition of Care episode on 1/7/2021. ACM/CTN was unsuccessful at contacting this patient today. LVM stating my name, CTN with [facility], date and time of call, and return telephone number. Patient/family was provided the following resources and education related to COVID-19 via [employer] message on 12/24/2020:                        Signs, symptoms and red flags related to COVID-19            CDC exposure and quarantine guidelines            [employer] exposure contact - [phone]            Contact for their local Department of Health                 Patient has not had any additional ED or hospital visits. No further outreach scheduled with this CTN/ACM. Episode of Care resolved. Patient has this CTN/ACM contact information if future needs arise.

## 2021-02-05 ENCOUNTER — TRANSCRIBE ORDER (OUTPATIENT)
Dept: SCHEDULING | Age: 51
End: 2021-02-05

## 2021-05-06 ENCOUNTER — OFFICE VISIT (OUTPATIENT)
Dept: CARDIOLOGY CLINIC | Age: 51
End: 2021-05-06
Payer: COMMERCIAL

## 2021-05-06 VITALS
HEIGHT: 65 IN | RESPIRATION RATE: 18 BRPM | HEART RATE: 95 BPM | DIASTOLIC BLOOD PRESSURE: 72 MMHG | OXYGEN SATURATION: 99 % | BODY MASS INDEX: 25.99 KG/M2 | SYSTOLIC BLOOD PRESSURE: 110 MMHG | WEIGHT: 156 LBS

## 2021-05-06 DIAGNOSIS — R00.0 TACHYCARDIA: Primary | ICD-10-CM

## 2021-05-06 DIAGNOSIS — Z79.4 INSULIN DEPENDENT TYPE 2 DIABETES MELLITUS (HCC): ICD-10-CM

## 2021-05-06 DIAGNOSIS — R00.0 SINUS TACHYCARDIA: ICD-10-CM

## 2021-05-06 DIAGNOSIS — E11.9 INSULIN DEPENDENT TYPE 2 DIABETES MELLITUS (HCC): ICD-10-CM

## 2021-05-06 DIAGNOSIS — R06.02 SOB (SHORTNESS OF BREATH): ICD-10-CM

## 2021-05-06 DIAGNOSIS — R94.31 ABNORMAL EKG: ICD-10-CM

## 2021-05-06 PROCEDURE — 99245 OFF/OP CONSLTJ NEW/EST HI 55: CPT | Performed by: SPECIALIST

## 2021-05-06 RX ORDER — ONDANSETRON 4 MG/1
TABLET, ORALLY DISINTEGRATING ORAL AS NEEDED
COMMUNITY

## 2021-05-06 RX ORDER — FLUCONAZOLE 150 MG/1
TABLET ORAL AS NEEDED
COMMUNITY
End: 2022-04-26

## 2021-05-06 RX ORDER — ESTRADIOL 0.1 MG/G
CREAM VAGINAL
COMMUNITY
End: 2022-04-26

## 2021-05-06 RX ORDER — SEMAGLUTIDE 1.34 MG/ML
1 INJECTION, SOLUTION SUBCUTANEOUS
COMMUNITY

## 2021-05-06 RX ORDER — ESCITALOPRAM OXALATE 20 MG/1
20 TABLET ORAL
COMMUNITY

## 2021-05-06 RX ORDER — INSULIN LISPRO 100 [IU]/ML
5 INJECTION, SOLUTION INTRAVENOUS; SUBCUTANEOUS
COMMUNITY

## 2021-05-06 RX ORDER — LEVOTHYROXINE SODIUM 25 UG/1
50 TABLET ORAL
COMMUNITY
Start: 2020-09-10

## 2021-05-06 RX ORDER — ESCITALOPRAM OXALATE 10 MG/1
TABLET ORAL
COMMUNITY
End: 2022-04-26

## 2021-05-06 RX ORDER — CLOBETASOL PROPIONATE 0.5 MG/G
CREAM TOPICAL
COMMUNITY

## 2021-05-06 RX ORDER — SEMAGLUTIDE 1.34 MG/ML
INJECTION, SOLUTION SUBCUTANEOUS
COMMUNITY
End: 2022-04-26

## 2021-05-06 RX ORDER — IBUPROFEN 800 MG/1
TABLET ORAL
COMMUNITY
End: 2022-04-26

## 2021-05-06 RX ORDER — BUSPIRONE HYDROCHLORIDE 15 MG/1
15 TABLET ORAL 2 TIMES DAILY
COMMUNITY

## 2021-05-06 RX ORDER — EZETIMIBE 10 MG/1
10 TABLET ORAL DAILY
COMMUNITY

## 2021-05-06 RX ORDER — INSULIN GLARGINE 100 [IU]/ML
INJECTION, SOLUTION SUBCUTANEOUS
COMMUNITY
End: 2022-04-26

## 2021-05-06 RX ORDER — AMOXICILLIN AND CLAVULANATE POTASSIUM 875; 125 MG/1; MG/1
TABLET, FILM COATED ORAL
COMMUNITY
End: 2022-04-26

## 2021-05-06 NOTE — PROGRESS NOTES
HISTORY OF PRESENT ILLNESS  Yudy Page is a 46 y.o. female. She is referred for evaluation of sinus tachycardia. She has a history of gastric bypass surgery and over the past year has suffered with diarrhea and possibly lost 30 pounds. She was finally found to have some colitis and treated with Flagyl that helped her although she developed a severe rash. This was last week and she was switched to Augmentin and also given prednisone. She has had severe diarrhea but it is now improved. 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 She had a coronavirus vaccination several months ago. She has had diabetes for 22 years and her hemoglobin A1c recently was 7.1. She does not smoke cigarettes or drink alcohol. She drinks 1 cup of coffee per day. Her father  of a heart attack at age 52. Slightly more than a week ago while at work she had heart rate of 156 on her apple watch associated with shortness of breath and diaphoresis. She relaxed and the heart rate eventually came down to 124 and she was able to drive home. Her heart rate was up to 136 earlier today but is normal in the office. She takes hydrochlorothiazide 25 mg a day and tells me that it was started for protection of her kidneys in the setting of diabetes. She denies any chest pain. I reviewed extensive records from her referring physician including EKGs in the office today.   HPI  Patient Active Problem List   Diagnosis Code    Essential hypertension, benign I10    Syncope and collapse R55    DM (diabetes mellitus) ([facility] [address] E11.9    PMDD (premenstrual dysphoric disorder) F32.81    Depression F32.9    Vitamin B12 deficiency E53.8    Menorrhagia N92.0    Vulvar lesion N90.89    Epigastric pain R10.13    Moderate major depression (HCC) F32.1    Sinus tachycardia R00.0     Current Outpatient Medications   Medication Sig Dispense Refill    escitalopram oxalate (Lexapro) 10 mg tablet 1 tab(s)      amoxicillin-clavulanate (AUGMENTIN) 875-125 mg per tablet amoxicillin 875 mg-potassium clavulanate 125 mg tablet      busPIRone (BUSPAR) 15 mg tablet buspirone 15 mg tablet      calcium citrate-vitamin d2 250 mg-2.5 mcg (100 unit) per tablet Gm-Citrate 250 mg calcium-2.5 mcg (100 unit) tablet   Take by oral route.  clobetasoL (TEMOVATE) 0.05 % topical cream clobetasol 0.05 % topical cream   Apply to labia 3x week      dapagliflozin (FARXIGA) 10 mg tab tablet Farxiga 10 mg tablet      estradioL (ESTRACE) 0.01 % (0.1 mg/gram) vaginal cream estradiol 0.01% (0.1 mg/gram) vaginal cream      ezetimibe (ZETIA) 10 mg tablet ezetimibe 10 mg tablet      fluconazole (Diflucan) 150 mg tablet as needed.  ibuprofen (MOTRIN) 800 mg tablet ibuprofen 800 mg tablet      insulin lispro (HUMALOG) 100 unit/mL kwikpen insulin lispro (U-100) 100 unit/mL subcutaneous pen      levothyroxine (SYNTHROID) 25 mcg tablet levothyroxine 25 mcg tablet      ondansetron (ZOFRAN ODT) 4 mg disintegrating tablet as needed.  semaglutide (Ozempic) 1 mg/dose (2 mg/1.5 mL) sub-q pen Ozempic 1 mg/dose (2 mg/1.5 mL) subcutaneous pen injector      polyethylene glycol (Miralax) 17 gram/dose powder Take 17 g by mouth daily. 1 tablespoon with 8 oz of water daily 510 g 0    pantoprazole (PROTONIX) 40 mg tablet TAKE ONE TABLET BY MOUTH DAILY 60 Tab 0    biotin 10,000 mcg cap Take  by mouth.  finasteride (PROSCAR) 5 mg tablet 5 mg daily.  hydroCHLOROthiazide (HYDRODIURIL) 25 mg tablet Take 25 mg by mouth daily as needed.  cyanocobalamin (VITAMIN B12) 1,000 mcg/mL injection       multivitamin with iron (DAILY MULTI-VITAMINS/IRON) tablet Take 1 Tab by mouth daily.       semaglutide (Ozempic) 1 mg/dose (2 mg/1.5 mL) sub-q pen 1mg      escitalopram oxalate (LEXAPRO) 20 mg tablet escitalopram 20 mg tablet      insulin glargine (Basaglar KwikPen U-100 Insulin) 100 unit/mL (3 mL) inpn Basaglar KwikPen U-100 Insulin 100 unit/mL (3 mL) subcutaneous      glipiZIDE (GLUCOTROL) 5 mg tablet TAKE ONE TABLET BY MOUTH TWICE A DAY 90 Tab 0    buPROPion (WELLBUTRIN) 75 mg tablet 150 mg twice a day. 2 tablets in the morning and 2 tablets in the evening 468 Tab 3    TRULICITY 6.12 YC/5.5 mL sub-q pen 0.5 mL by SubCUTAneous route every seven (7) days. 9 Pen 3    spironolactone (ALDACTONE) 50 mg tablet Take 1 Tab by mouth daily. 80 Tab 3     Past Medical History:   Diagnosis Date    Depression     Depression with anxiety     Diabetes ([facility] [address]     Essential hypertension, benign 11/17/2010    History of ectopic pregnancy 8/27/2987    Hypercholesterolemia     Hypertension 1995    Ill-defined condition     sycope, states from new diabetic medication    MRSA infection (methicillin-resistant Staphylococcus aureus)     boil in groin culture came back with MRSA.  Nausea & vomiting     Nausea    Syncope and collapse 11/17/2010    Unspecified adverse effect of anesthesia     Had tachycardia     Past Surgical History:   Procedure Laterality Date    HX GASTRIC BYPASS  2005    HX GASTRIC BYPASS  2007    revision of gastric bypass    HX GYN  2015    endometrial ablation    HX ORTHOPAEDIC  1984    r knee scope removal of extra bone.  HX OTHER SURGICAL  2009    tummy tuck    HX TUBAL LIGATION  2008    tubal ligation    MN ABDOMEN SURGERY PROC UNLISTED         Review of Systems   Constitutional: Positive for diaphoresis. Respiratory: Positive for shortness of breath. Cardiovascular: Positive for palpitations. Gastrointestinal: Positive for diarrhea. Skin: Positive for rash. All other systems reviewed and are negative. Visit Vitals  /72 (BP 1 Location: Right arm, BP Patient Position: Sitting, BP Cuff Size: Adult)   Pulse 95   Resp 18   Ht 5' 5\" (1.651 m)   Wt 156 lb (70.8 kg)   LMP 07/17/2015   SpO2 99%   BMI 25.96 kg/m²       Physical Exam   Constitutional: She appears well-nourished. HENT:   Head: Atraumatic.    Eyes: Conjunctivae are normal.   Neck: Neck supple. Cardiovascular: Normal rate, regular rhythm and normal heart sounds. Exam reveals no gallop and no friction rub. No murmur heard. Pulmonary/Chest: Breath sounds normal. She has no wheezes. She has no rales. Abdominal: Bowel sounds are normal.   Musculoskeletal:         General: No edema. Neurological: She is alert. Skin: Skin is dry. Psychiatric: Her behavior is normal.   Nursing note and vitals reviewed. ASSESSMENT and PLAN  I suspect that her heart rate was rapid due to sinus tachycardia in the setting of the diarrhea and weight loss as well as the rash and drug reaction. It does not sound like atrial flutter since it did gradually come down even rate. If her heart rate were 156 on her apple watch this would be too slow for AV bentley reentrant tachycardia. I do not think she needs to be on the hydrochlorothiazide 25 mg so I will have her stop the medication. I will see her back within the next several weeks with an echocardiogram to be done in the office. She recently was started on therapy for low potassium possibly due to the diarrhea.

## 2021-06-16 ENCOUNTER — ANCILLARY PROCEDURE (OUTPATIENT)
Dept: CARDIOLOGY CLINIC | Age: 51
End: 2021-06-16
Payer: COMMERCIAL

## 2021-06-16 ENCOUNTER — OFFICE VISIT (OUTPATIENT)
Dept: CARDIOLOGY CLINIC | Age: 51
End: 2021-06-16

## 2021-06-16 VITALS
BODY MASS INDEX: 26.33 KG/M2 | HEART RATE: 85 BPM | OXYGEN SATURATION: 99 % | SYSTOLIC BLOOD PRESSURE: 126 MMHG | WEIGHT: 158 LBS | RESPIRATION RATE: 18 BRPM | DIASTOLIC BLOOD PRESSURE: 90 MMHG | HEIGHT: 65 IN

## 2021-06-16 VITALS
BODY MASS INDEX: 25.99 KG/M2 | HEIGHT: 65 IN | SYSTOLIC BLOOD PRESSURE: 110 MMHG | WEIGHT: 156 LBS | DIASTOLIC BLOOD PRESSURE: 72 MMHG

## 2021-06-16 DIAGNOSIS — Z79.4 INSULIN DEPENDENT TYPE 2 DIABETES MELLITUS (HCC): ICD-10-CM

## 2021-06-16 DIAGNOSIS — E11.9 INSULIN DEPENDENT TYPE 2 DIABETES MELLITUS (HCC): ICD-10-CM

## 2021-06-16 DIAGNOSIS — R94.31 ABNORMAL EKG: ICD-10-CM

## 2021-06-16 DIAGNOSIS — R06.02 SOB (SHORTNESS OF BREATH): ICD-10-CM

## 2021-06-16 DIAGNOSIS — R00.0 TACHYCARDIA: Primary | ICD-10-CM

## 2021-06-16 DIAGNOSIS — R00.0 TACHYCARDIA: ICD-10-CM

## 2021-06-16 DIAGNOSIS — R00.0 SINUS TACHYCARDIA: ICD-10-CM

## 2021-06-16 LAB
ECHO AO ASC DIAM: 2.21 CM
ECHO AO ROOT DIAM: 2.66 CM
ECHO AV MEAN GRADIENT: 3.64 MMHG
ECHO AV PEAK GRADIENT: 7.53 MMHG
ECHO AV PEAK VELOCITY: 137.22 CM/S
ECHO AV VTI: 23.8 CM
ECHO EST RA PRESSURE: 5 MMHG
ECHO LA AREA 4C: 9.37 CM2
ECHO LA MAJOR AXIS: 3.28 CM
ECHO LA MINOR AXIS: 1.83 CM
ECHO LA VOL 2C: 34.63 ML (ref 22–52)
ECHO LA VOL 4C: 16.98 ML (ref 22–52)
ECHO LA VOL BP: 27.87 ML (ref 22–52)
ECHO LA VOL/BSA BIPLANE: 15.57 ML/M2 (ref 16–28)
ECHO LA VOLUME INDEX A2C: 19.35 ML/M2 (ref 16–28)
ECHO LA VOLUME INDEX A4C: 9.49 ML/M2 (ref 16–28)
ECHO LV E' LATERAL VELOCITY: 12.21 CM/S
ECHO LV E' SEPTAL VELOCITY: 8.78 CM/S
ECHO LV EDV A2C: 52.15 ML
ECHO LV EDV A4C: 49.69 ML
ECHO LV EDV BP: 52.24 ML (ref 56–104)
ECHO LV EDV INDEX A4C: 27.8 ML/M2
ECHO LV EDV INDEX BP: 29.2 ML/M2
ECHO LV EDV NDEX A2C: 29.1 ML/M2
ECHO LV EJECTION FRACTION A2C: 62 PERCENT
ECHO LV EJECTION FRACTION A4C: 57 PERCENT
ECHO LV EJECTION FRACTION BIPLANE: 60.2 PERCENT (ref 55–100)
ECHO LV ESV A2C: 19.58 ML
ECHO LV ESV A4C: 21.33 ML
ECHO LV ESV BP: 20.8 ML (ref 19–49)
ECHO LV ESV INDEX A2C: 10.9 ML/M2
ECHO LV ESV INDEX A4C: 11.9 ML/M2
ECHO LV ESV INDEX BP: 11.6 ML/M2
ECHO LV INTERNAL DIMENSION DIASTOLIC: 3.76 CM (ref 3.9–5.3)
ECHO LV INTERNAL DIMENSION SYSTOLIC: 2.59 CM
ECHO LV IVSD: 0.77 CM (ref 0.6–0.9)
ECHO LV MASS 2D: 68 G (ref 67–162)
ECHO LV MASS INDEX 2D: 38 G/M2 (ref 43–95)
ECHO LV POSTERIOR WALL DIASTOLIC: 0.59 CM (ref 0.6–0.9)
ECHO LVOT DIAM: 2.02 CM
ECHO MV A VELOCITY: 77.28 CM/S
ECHO MV AREA PHT: 3.67 CM2
ECHO MV E DECELERATION TIME (DT): 206.47 MS
ECHO MV E VELOCITY: 49.71 CM/S
ECHO MV E/A RATIO: 0.64
ECHO MV E/E' LATERAL: 4.07
ECHO MV E/E' RATIO (AVERAGED): 4.87
ECHO MV E/E' SEPTAL: 5.66
ECHO MV PRESSURE HALF TIME (PHT): 59.88 MS
ECHO RA MAJOR AXIS: 2.94 CM
ECHO RIGHT VENTRICULAR SYSTOLIC PRESSURE (RVSP): 24 MMHG
ECHO RV INTERNAL DIMENSION: 2.69 CM
ECHO RV TAPSE: 1.84 CM (ref 1.5–2)
ECHO TV REGURGITANT MAX VELOCITY: 219.75 CM/S
ECHO TV REGURGITANT PEAK GRADIENT: 19.32 MMHG
LA VOL DISK BP: 25.66 ML (ref 22–52)

## 2021-06-16 PROCEDURE — 93306 TTE W/DOPPLER COMPLETE: CPT | Performed by: SPECIALIST

## 2021-06-16 PROCEDURE — 3051F HG A1C>EQUAL 7.0%<8.0%: CPT | Performed by: SPECIALIST

## 2021-06-16 PROCEDURE — 99214 OFFICE O/P EST MOD 30 MIN: CPT | Performed by: SPECIALIST

## 2021-06-16 RX ORDER — HYDROXYZINE 25 MG/1
TABLET, FILM COATED ORAL AS NEEDED
COMMUNITY
Start: 2021-04-28 | End: 2022-04-26

## 2021-06-16 NOTE — PROGRESS NOTES
HISTORY OF PRESENT ILLNESS  Yudy Michaels is a 46 y.o. female. She has a history of gastric bypass surgery 16 years ago. More recently she had antibiotics and diarrhea and a course of steroids and lost another 30 pounds. She had tachycardia in the setting of diarrhea and hypokalemia. I stopped her hydrochlorothiazide and she is now doing better. Her echocardiogram today is unremarkable. She remains off the hydrochlorothiazide. HPI  Patient Active Problem List   Diagnosis Code    Essential hypertension, benign I10    Syncope and collapse R55    DM (diabetes mellitus) ([facility] [address] E11.9    PMDD (premenstrual dysphoric disorder) F32.81    Depression F32.9    Vitamin B12 deficiency E53.8    Menorrhagia N92.0    Vulvar lesion N90.89    Epigastric pain R10.13    Moderate major depression (HCC) F32.1    Sinus tachycardia R00.0     Current Outpatient Medications   Medication Sig Dispense Refill    hydrOXYzine HCL (ATARAX) 25 mg tablet as needed.  semaglutide (Ozempic) 1 mg/dose (2 mg/1.5 mL) sub-q pen 1mg      busPIRone (BUSPAR) 15 mg tablet buspirone 15 mg tablet      calcium citrate-vitamin d2 250 mg-2.5 mcg (100 unit) per tablet Gm-Citrate 250 mg calcium-2.5 mcg (100 unit) tablet   Take by oral route.  clobetasoL (TEMOVATE) 0.05 % topical cream clobetasol 0.05 % topical cream   Apply to labia 3x week      dapagliflozin (FARXIGA) 10 mg tab tablet Farxiga 10 mg tablet      escitalopram oxalate (LEXAPRO) 20 mg tablet escitalopram 20 mg tablet      estradioL (ESTRACE) 0.01 % (0.1 mg/gram) vaginal cream estradiol 0.01% (0.1 mg/gram) vaginal cream      ezetimibe (ZETIA) 10 mg tablet ezetimibe 10 mg tablet      fluconazole (Diflucan) 150 mg tablet as needed.       ibuprofen (MOTRIN) 800 mg tablet ibuprofen 800 mg tablet      insulin lispro (HUMALOG) 100 unit/mL kwikpen insulin lispro (U-100) 100 unit/mL subcutaneous pen      levothyroxine (SYNTHROID) 25 mcg tablet levothyroxine 25 mcg tablet      ondansetron (ZOFRAN ODT) 4 mg disintegrating tablet as needed.  polyethylene glycol (Miralax) 17 gram/dose powder Take 17 g by mouth daily. 1 tablespoon with 8 oz of water daily 510 g 0    pantoprazole (PROTONIX) 40 mg tablet TAKE ONE TABLET BY MOUTH DAILY 60 Tab 0    biotin 10,000 mcg cap Take  by mouth.  cyanocobalamin (VITAMIN B12) 1,000 mcg/mL injection       multivitamin with iron (DAILY MULTI-VITAMINS/IRON) tablet Take 1 Tab by mouth daily.  escitalopram oxalate (Lexapro) 10 mg tablet 1 tab(s) (Patient not taking: Reported on 6/16/2021)      amoxicillin-clavulanate (AUGMENTIN) 875-125 mg per tablet amoxicillin 875 mg-potassium clavulanate 125 mg tablet (Patient not taking: Reported on 6/16/2021)      insulin glargine (Basaglar KwikPen U-100 Insulin) 100 unit/mL (3 mL) inpn Basaglar KwikPen U-100 Insulin 100 unit/mL (3 mL) subcutaneous (Patient not taking: Reported on 6/16/2021)      semaglutide (Ozempic) 1 mg/dose (2 mg/1.5 mL) sub-q pen Ozempic 1 mg/dose (2 mg/1.5 mL) subcutaneous pen injector (Patient not taking: Reported on 6/16/2021)      glipiZIDE (GLUCOTROL) 5 mg tablet TAKE ONE TABLET BY MOUTH TWICE A DAY (Patient not taking: Reported on 6/16/2021) 90 Tab 0    buPROPion (WELLBUTRIN) 75 mg tablet 150 mg twice a day. 2 tablets in the morning and 2 tablets in the evening (Patient not taking: Reported on 6/16/2021) 050 Tab 3    TRULICITY 9.51 QS/4.0 mL sub-q pen 0.5 mL by SubCUTAneous route every seven (7) days. (Patient not taking: Reported on 6/16/2021) 9 Pen 3    finasteride (PROSCAR) 5 mg tablet 5 mg daily.  (Patient not taking: Reported on 6/16/2021)       Past Medical History:   Diagnosis Date    Depression     Depression with anxiety     Diabetes ([facility] [address]     Essential hypertension, benign 11/17/2010    History of ectopic pregnancy 8/27/2987    Hypercholesterolemia     Hypertension 1995    Ill-defined condition     sycope, states from new diabetic medication    MRSA infection (methicillin-resistant Staphylococcus aureus)     boil in groin culture came back with MRSA.  Nausea & vomiting     Nausea    Syncope and collapse 11/17/2010    Unspecified adverse effect of anesthesia     Had tachycardia       Review of Systems   Constitutional: Positive for weight loss. Cardiovascular: Positive for palpitations. All other systems reviewed and are negative. Visit Vitals  BP (!) 126/90 (BP 1 Location: Left upper arm, BP Patient Position: Sitting, BP Cuff Size: Adult)   Pulse 85   Resp 18   Ht 5' 5\" (1.651 m)   Wt 158 lb (71.7 kg)   LMP 07/17/2015   SpO2 99%   BMI 26.29 kg/m²       Physical Exam  Vitals and nursing note reviewed. Constitutional:       Appearance: Normal appearance. HENT:      Head: Normocephalic. Right Ear: External ear normal.      Left Ear: External ear normal.      Nose: Nose normal.      Mouth/Throat:      Mouth: Mucous membranes are moist.   Eyes:      Extraocular Movements: Extraocular movements intact. Cardiovascular:      Rate and Rhythm: Normal rate and regular rhythm. Pulses: Normal pulses. Heart sounds: Normal heart sounds. Pulmonary:      Effort: Pulmonary effort is normal.   Abdominal:      Palpations: Abdomen is soft. Musculoskeletal:         General: Normal range of motion. Cervical back: Normal range of motion. Skin:     General: Skin is warm. Neurological:      General: No focal deficit present. Mental Status: She is alert. Psychiatric:         Mood and Affect: Mood normal.         ASSESSMENT and PLAN  She is doing better and I suspect that her tachycardia was sinus tachycardia due to dehydration hypokalemia. For now she will stay off the hydrochlorothiazide and I will see her back in the future on an as-needed basis.

## 2022-03-18 PROBLEM — R00.0 SINUS TACHYCARDIA: Status: ACTIVE | Noted: 2021-05-06

## 2022-03-19 PROBLEM — F32.1 MODERATE MAJOR DEPRESSION (HCC): Status: ACTIVE | Noted: 2018-04-05

## 2022-04-26 ENCOUNTER — HOSPITAL ENCOUNTER (OUTPATIENT)
Dept: PREADMISSION TESTING | Age: 52
Discharge: HOME | End: 2022-04-26
Payer: SELF-PAY

## 2022-04-26 VITALS
TEMPERATURE: 98 F | DIASTOLIC BLOOD PRESSURE: 75 MMHG | OXYGEN SATURATION: 98 % | WEIGHT: 158.07 LBS | SYSTOLIC BLOOD PRESSURE: 106 MMHG | HEIGHT: 65 IN | HEART RATE: 72 BPM | BODY MASS INDEX: 26.34 KG/M2

## 2022-04-26 LAB
ANION GAP SERPL CALC-SCNC: 6 MMOL/L (ref 5–15)
ATRIAL RATE: 60 BPM
BASOPHILS # BLD: 0.1 K/UL (ref 0–0.1)
BASOPHILS NFR BLD: 1 % (ref 0–1)
BUN SERPL-MCNC: 24 MG/DL (ref 6–20)
BUN/CREAT SERPL: 31 (ref 12–20)
CALCIUM SERPL-MCNC: 8.8 MG/DL (ref 8.5–10.1)
CALCULATED P AXIS, ECG09: 28 DEGREES
CALCULATED R AXIS, ECG10: 39 DEGREES
CALCULATED T AXIS, ECG11: 13 DEGREES
CHLORIDE SERPL-SCNC: 102 MMOL/L (ref 97–108)
CO2 SERPL-SCNC: 32 MMOL/L (ref 21–32)
CREAT SERPL-MCNC: 0.77 MG/DL (ref 0.55–1.02)
DIAGNOSIS, 93000: NORMAL
DIFFERENTIAL METHOD BLD: ABNORMAL
EOSINOPHIL # BLD: 0.5 K/UL (ref 0–0.4)
EOSINOPHIL NFR BLD: 5 % (ref 0–7)
ERYTHROCYTE [DISTWIDTH] IN BLOOD BY AUTOMATED COUNT: 14.3 % (ref 11.5–14.5)
EST. AVERAGE GLUCOSE BLD GHB EST-MCNC: 137 MG/DL
GLUCOSE SERPL-MCNC: 88 MG/DL (ref 65–100)
HBA1C MFR BLD: 6.4 % (ref 4–5.6)
HCT VFR BLD AUTO: 39.5 % (ref 35–47)
HGB BLD-MCNC: 13.1 G/DL (ref 11.5–16)
IMM GRANULOCYTES # BLD AUTO: 0 K/UL (ref 0–0.04)
IMM GRANULOCYTES NFR BLD AUTO: 0 % (ref 0–0.5)
LYMPHOCYTES # BLD: 2.9 K/UL (ref 0.8–3.5)
LYMPHOCYTES NFR BLD: 32 % (ref 12–49)
MCH RBC QN AUTO: 28.9 PG (ref 26–34)
MCHC RBC AUTO-ENTMCNC: 33.2 G/DL (ref 30–36.5)
MCV RBC AUTO: 87.2 FL (ref 80–99)
MONOCYTES # BLD: 0.6 K/UL (ref 0–1)
MONOCYTES NFR BLD: 6 % (ref 5–13)
NEUTS SEG # BLD: 5.1 K/UL (ref 1.8–8)
NEUTS SEG NFR BLD: 56 % (ref 32–75)
NRBC # BLD: 0 K/UL (ref 0–0.01)
NRBC BLD-RTO: 0 PER 100 WBC
P-R INTERVAL, ECG05: 130 MS
PLATELET # BLD AUTO: 363 K/UL (ref 150–400)
PMV BLD AUTO: 11.2 FL (ref 8.9–12.9)
POTASSIUM SERPL-SCNC: 3.8 MMOL/L (ref 3.5–5.1)
Q-T INTERVAL, ECG07: 442 MS
QRS DURATION, ECG06: 80 MS
QTC CALCULATION (BEZET), ECG08: 442 MS
RBC # BLD AUTO: 4.53 M/UL (ref 3.8–5.2)
SODIUM SERPL-SCNC: 140 MMOL/L (ref 136–145)
VENTRICULAR RATE, ECG03: 60 BPM
WBC # BLD AUTO: 9.2 K/UL (ref 3.6–11)

## 2022-04-26 PROCEDURE — 83036 HEMOGLOBIN GLYCOSYLATED A1C: CPT

## 2022-04-26 PROCEDURE — 80048 BASIC METABOLIC PNL TOTAL CA: CPT

## 2022-04-26 PROCEDURE — 85025 COMPLETE CBC W/AUTO DIFF WBC: CPT

## 2022-04-26 PROCEDURE — 93005 ELECTROCARDIOGRAM TRACING: CPT

## 2022-04-26 PROCEDURE — 36415 COLL VENOUS BLD VENIPUNCTURE: CPT

## 2022-04-26 RX ORDER — HYDROCHLOROTHIAZIDE 25 MG/1
25 TABLET ORAL DAILY
COMMUNITY

## 2022-04-26 RX ORDER — BEMPEDOIC ACID 180 MG/1
1 TABLET, FILM COATED ORAL DAILY
COMMUNITY

## 2022-04-26 RX ORDER — GABAPENTIN 300 MG/1
300 CAPSULE ORAL
COMMUNITY

## 2022-04-26 NOTE — PERIOP NOTES
[address] INSTRUCTIONS    Surgery Date:   5-10-22    [facility] staff will call you between 4 PM- 8 PM the day before surgery with your arrival time. If your surgery is on a Monday, we will call you the preceding Friday. Please call [phone] after 8 PM if you did not receive your arrival time. 1. Please report to [facility] Patient Access/Admitting on the 1st floor. Bring your insurance card, photo identification, and any copayment ( if applicable). 2. If you are going home the same day of your surgery, you must have a responsible adult to drive you home. You need to have a responsible adult to stay with you the first 24 hours after surgery and you should not drive a car for 24 hours following your surgery. 3. Do NOT eat any solid foods after midnight the night before surgery including candy, mint or gum. You may drink clear liquids from midnight until 1 hour prior to your arrival. You may drink up to 12 ounces at one time every 4 hours. Please note special instructions, if applicable, below for medications. 4. Do NOT drink alcohol or smoke 24 hours before surgery. STOP smoking for 14 days prior as it helps with breathing and healing after surgery. 5. If you are being admitted to the hospital, please leave personal belongings/luggage in your car until you have an assigned hospital room number. 6. Please wear comfortable clothes. Wear your glasses instead of contacts. We ask that all money, jewelry and valuables be left at home. Wear no make up, particularly mascara, the day of surgery. 7.  All body piercings, rings, and jewelry need to be removed and left at home. Please remove any nail polish or artifical nails from your fingernails. Please wear your hair loose or down. Please no pony-tails, buns, or any metal hair accessories. If you shower the morning of surgery, please do not apply any lotions or powders afterwards. You may wear deodorant, unless having breast surgery.   Do not shave any body area within 24 hours of your surgery. 8. Please follow all instructions to avoid any potential surgical cancellation. 9. Should your physical condition change, (i.e. fever, cold, flu, etc.) please notify your surgeon as soon as possible. 10. It is important to be on time. If a situation occurs where you may be delayed, please call:  [phone] / [phone] on the day of surgery. 11. The Preadmission Testing staff can be reached at [phone]. 12. Special instructions: NONE      Current Outpatient Medications   Medication Sig    hydroCHLOROthiazide (HYDRODIURIL) 25 mg tablet Take 25 mg by mouth daily.  bempedoic acid (Nexletol) 180 mg tab Take 1 Tablet by mouth daily.  gabapentin (NEURONTIN) 300 mg capsule Take 300 mg by mouth nightly.  semaglutide (Ozempic) 1 mg/dose (2 mg/1.5 mL) sub-q pen 1 mg by SubCUTAneous route every seven (7) days. TAKES EVERY SUNDAY    busPIRone (BUSPAR) 15 mg tablet Take 15 mg by mouth two (2) times a day.  calcium citrate-vitamin d2 250 mg-2.5 mcg (100 unit) per tablet Gm-Citrate 250 mg calcium-2.5 mcg (100 unit) tablet   Take by oral route.  dapagliflozin (FARXIGA) 10 mg tab tablet Take 5 mg by mouth daily.  escitalopram oxalate (LEXAPRO) 20 mg tablet Take 20 mg by mouth.  ezetimibe (ZETIA) 10 mg tablet Take 10 mg by mouth daily.  insulin lispro (HUMALOG) 100 unit/mL kwikpen 5 Units by SubCUTAneous route Before breakfast, lunch, and dinner.  levothyroxine (SYNTHROID) 25 mcg tablet Take 50 mcg by mouth Daily (before breakfast).  ondansetron (ZOFRAN ODT) 4 mg disintegrating tablet as needed.  pantoprazole (PROTONIX) 40 mg tablet TAKE ONE TABLET BY MOUTH DAILY    cyanocobalamin (VITAMIN B12) 1,000 mcg/mL injection     multivitamin with iron (DAILY MULTI-VITAMINS/IRON) tablet Take 1 Tab by mouth daily.     clobetasoL (TEMOVATE) 0.05 % topical cream clobetasol 0.05 % topical cream   Apply to labia 3x week (Patient not taking: Reported on 4/26/2022)     No current facility-administered medications for this encounter. 1. YOU MUST ONLY TAKE THESE MEDICATIONS THE MORNING OF SURGERY WITH A SIP OF WATER: BUSPAR, LEXAPRO, PANTOPRAZOLE, LEVOTHYROXINE  2. MEDICATIONS TO TAKE THE MORNING OF SURGERY ONLY IF NEEDED: N/A  3. HOLD these prescription medications DAY OF  Surgery: FARXIGA, INSULIN, NEXLETOL, ZETIA, HCTZ  4. Ask your surgeon/prescribing physician about when/if to STOP taking these medications: N/A  5. Stop all vitamins, herbal medicines and Aspirin containing products 7 days prior to surgery. Stop any non-steroidal anti-inflammatory drugs (i.e. Ibuprofen, Naproxen, Advil, Aleve) 3 days before surgery. You may take Tylenol. 6. If you are currently taking Plavix, Coumadin,or any other blood-thinning/anticoagulant medication contact your prescribing physician for instructions. Eating and Drinking Before Surgery     You may eat a regular dinner at the usual time on the day before your surgery.  Do NOT eat any solid foods after midnight unless your arrival time at the hospital is 3pm or later.  You may drink clear liquids only from 12 midnight until 1 hours prior to your arrival time at the hospital on the day of your surgery. Do NOT drink alcohol.    Clear liquids include:  o Water  o Fruit juices without pulp( i.e. apple juice)  o Carbonated beverages  o Black coffee (no cream/milk)  o Tea (no cream/milk)  o Gatorade   You may drink up to 12-16 ounces at one time every 4 hours between the hours of midnight and 1 hour before your arrival time at the hospital. Example- if your arrival time at the hospital is 6am, you may drink 12-16 ounces of clear liquids no later than 5am.   If your arrival time at the hospital is 3pm or later, you may eat a light breakfast before 8am.   A light breakfast includes:  o Toast or bagel (no butter)  o Black coffee (no cream/milk)  o Tea (no cream/milk)  o Fruit juices without pulp ( i. e. apple juice)  o Do NOT eat meat, eggs, vegetables or fruit   If you have any questions, please contact your surgeon's office. Preventing Infections Before and After  Your Surgery    IMPORTANT INSTRUCTIONS    You play an important role in your health and preparation for surgery. To reduce the germs on your skin you will need to shower with CHG soap (Chorhexidine gluconate 4%) two times before surgery. CHG soap (Hibiclens, Hex-A-Clens or store brand)   CHG soap will be provided at your Preadmission Testing (PAT) appointment.  If you do not have a PAT appointment before surgery, you may arrange to  CHG soap from our office or purchase CHG soap at a pharmacy, grocery or department store.  You need to purchase TWO 4 ounce bottles to use for your 2 showers. Steps to follow:  1. Wash your hair with your normal shampoo and your body with regular soap and rinse well to remove shampoo and soap from your skin. 2. Wet a clean washcloth and turn off the shower. 3. Put CHG soap on washcloth and apply to your entire body from the neck down. Do not use on your head, face or private parts(genitals). Do not use CHG soap on open sores, wounds or areas of skin irritation. 4. Wash you body gently for 5 minutes. Do not wash your skin too hard. This soap does not create lather. Pay special attention to your underarms and from your belly button to your feet. 5. Turn the shower back on and rinse well to get CHG soap off your body. 6. Pat your skin dry with a clean, dry towel. Do not apply lotions or moisturizer. 7. Put on clean clothes and sleep on fresh bed sheets and do not allow pets to sleep with you. Shower with CHG soap 2 times before your surgery   The evening before your surgery   The morning of your surgery      Tips to help prevent infections after your surgery:  1.  Protect your surgical wound from germs:  ? Hand washing is the most important thing you and your caregivers can do to prevent infections. ? Keep your bandage clean and dry! ? Do not touch your surgical wound. 2. Use clean, freshly washed towels and washcloths every time you shower; do not share bath linens with others. 3. Until your surgical wound is healed, wear clothing and sleep on bed linens each day that are clean and freshly washed. 4. Do not allow pets to sleep in your bed with you or touch your surgical wound. 5. Do not smoke  smoking delays wound healing. This may be a good time to stop smoking. 6. If you have diabetes, it is important for you to manage your blood sugar levels properly before your surgery as well as after your surgery. Poorly managed blood sugar levels slow down wound healing and prevent you from healing completely. Patient Information Regarding COVID Restrictions      Day of Procedure     Please park in the parking deck or any designated visitor parking lot.  Enter the facility through the [facility].   On the day of surgery, please provide the cell phone number of the person who will be waiting for you to the Patient Access representative at the time of registration.  Please wear a mask on the day of your procedure.  We are now allowing two designated visitors per stay. Pediatric patients may have 2 designated visitors. These two people may come in with you on the day of your procedure.  No visitors under the age of 13.  The designated visitor must also wear a mask.  Once your procedure and the immediate recovery period is completed, a nurse in the recovery area will contact your designated visitor to inform them of your room number or to otherwise review other pertinent information regarding your care.  Social distancing practices are to be adhered to in waiting areas and the cafeteria. The patient was contacted  in person. She verbalized understanding of all instructions does not  need reinforcement.

## 2022-04-26 NOTE — PERIOP NOTES
PAT instructions reviewed with patient and given the opportunity to ask questions. Patient given surgical site information FAQs handout and reviewed. Patient given two bottles CHG soap and instruction sheet, instructions for use reviewed with patient. MRSA/MSSA treatment instruction sheet given with an explanation to patient that they  will be notified if treatment instructions need to be initiated. Patient was given the opportunity to ask questions on the information provided.

## 2022-04-27 LAB
BACTERIA SPEC CULT: ABNORMAL
SERVICE CMNT-IMP: ABNORMAL

## 2022-04-27 NOTE — PERIOP NOTES
TRINITY AT DR ZINSSER'S OFFICE NOTIFIED OF POSITIVE MRSA CULTURE. MADE AWARE THAT OUR NP IS OUT ON ML SO DR Fatimah Cochran WOULD HAVE TO TAKE CARE OF THIS.

## 2022-05-09 PROBLEM — Z41.1 ENCOUNTER FOR COSMETIC SURGERY: Status: ACTIVE | Noted: 2022-05-09

## 2022-05-09 NOTE — H&P
Yudy Serrano  : 1970  DOS: 2022    Chief Complaint: consultation       Allergies: No Non-Medication Allergies (NNMA) , metformin , Flagyl , metoclopramide       Medications: gabapentin , Ozempic (1 mg dose) , Farxiga 5 mg oral tablet , Zetia 10 mg oral tablet , hydroCHLOROthiazide 25 mg oral tablet , pantoprazole 40 mg oral delayed release tablet , Lexapro 20 mg oral tablet , Nexletol 180 mg oral tablet , levothyroxine 50 mcg (0.05 mg) oral tablet , Multiple Vitamins oral tablet       Medical History: Height: 5' 5\", Weight: 152 lbs    Pulmonary System: Allergies  Cardiac System: Negative  Blood and Liver Systems: Diabetes  Neurologic and Endocrine Systems: anxiety, depression, arthritis, thyroid issues, gerd  GI,  and Reproductive Systems: Negative  Cancer: Negative   Surgical History: None Indicated ,  Left rotator cuff repair ,  right shoulder  ,  partial hysterectomy ,  endometrial ablation  ,  Tummy tuck ,  Revision of gastric bypass and gallbladder ,  Gastric Bypass       Family History: High Blood Pressure Mother , Heart Disease Father , Depression Mother , Hypertension Father , High Blood Pressure Father , Depression Daughter , Hypertension Brother , Cancer Brother , High Blood Pressure Brother , Heart Disease Brother , Depression Sister , Hypertension Mother , None Indicated       Social History: Smoking Status  4 Never smoker  Pregnancy   3 pregnancy            Ms. Kareen Schreiber is a pleasant 12-year-old female who is a previous elective body contouring surgery patient who underwent a circumferential/belt abdominoplasty in . She was last seen in our office in follow-up in 2012. She presents today for a consultation regarding her desire to undergo elective surgery on her breasts and her arms. She does have a history of obesity and was as heavy as 300 pounds. She underwent a Kaveh-en-Y gastric bypass in  and currently is at 152 pounds.   In addition, she has 2 children that are grown adults with breast-feeding. She denies a history of breast cancer in her family, and she currently wears a size 38 B bra. She is interested in a more youthful, lifted profile of her breasts, larger in volume, and higher on her chest.  In addition, she would like to address the soft tissue changes and laxity to her upper arms following her marked weight loss. Review of systems reveals normal heart and lung sounds. Examination of her arms reveals the typical batwing deformity we see in weight loss patients. The soft tissue laxity extends below the axilla to the lateral chest wall lateral to the breast.  Examination of her breasts reveals grade 3 ptosis and what appears to be a subtle expression of a tubular constriction on the right compared to the left. The nipple areolar complexes are near the lowest pole of her breasts and are minimally enlarged. The sternal notch to nipple distance is 28 cm on the right, 25.5 cm on the left, and the ideal nipple position is around 23 cm. The nipple to fold distance is 8 cm on the right, 9 cm on the left, and her base diameter is 15 cm. I had a lengthy discussion with Yudy, regarding breast augmentation mammoplasty. Yudy understands this is performed under a general anesthetic on an outpatient basis. I diagrammed on paper and patients right breast where the incisions are made and we discussed the differences between saline and silicone implants, smooth versus textured, round versus anatomic, submuscular versus subglandular, and the various incisions. In most cases an implant is placed in a submuscular plane and the incisions are closed in layers with dissolvable sutures. A compression Ace wrap is used for 2 days then converted to a loose surgical bra or no bra at all for 4 weeks. There is no strenuous exercise for 4 weeks.  The risks and complications include but are not limited to infection, pain, bleeding, hematoma's requiring re-operation, skin sensitivity changes, vascular compromise to tissues resulting in partial or complete loss of tissues, resultant open wounds, the need for long term wound care and dressing changes and scarring, irregularities and asymmetries requiring touch-up and revision surgery, an unacceptable cosmetic result, and other things including cardiac and pulmonary risks that include death. Implant specific problems include capsular contracture, scalloping or rippling, implant malposition, implant failure, and implant infection. In addition to augmentation, to provide her with the most ideal cosmetic result I believe she will require a skin tightening procedure or mastopexy. She understands once the implant of her selection is placed usually in a submuscular plane, additional incisions are made around the nipple areolar complex and the lower pole of her breasts to address nipple areolar complex size and position. Our goal is to remove any redundant lower pole skin and ensure the breast and all the tissue sits above the inframammary fold with the nipple on the most anterior projection on the meridian line. Any incisions are closed in layers with dissolvable sutures. The excision can be extended lateral to the breast on the chest wall below the axilla to contour that area as well and oftentimes the brachioplasty incision transitions directly into the lateral chest wall incision. A compression garment is used with associated activity restrictions while she is healing which can extend beyond 6 weeks. To address the upper arms, she is a candidate for a brachioplasty or excision dermo- lipectomy of the upper arms from the elbow to the axilla. She understands an [place] of skin and underlying fat down to the muscle fascia is excised from the elbow to the axilla. There is limited undermining, and the excisional wound is closed in layers with dissolvable sutures.   A compression garment is used with associated activity restrictions while she is healing which can extend beyond 6 weeks. There is no exercise for 4 weeks. The risks and complications include but are not limited to infection, pain, bleeding, hematomas requiring re-operation, skin sensitivity changes, vascular compromise to tissues resulting in partial or complete loss of tissues, resultant open wounds, the need for long term wound care and dressing changes and scarring, irregularities and asymmetries requiring touch-up and revision surgery, an unacceptable cosmetic result, and other things including cardiac and pulmonary risks that include death. Additional implant specific risks include capsule contracture, scalloping or rippling, implant malposition, implant failure, and implant infection. We also discussed breast implant illness and NORMAN-ALCL. She was sized today by [employer] and selected a 450-cc smooth round cohesive gel device in a submuscular plane combined with at least a vertical mastopexy. Her questions were answered, and pictures were taken. She will meet with Micki to discuss the fees for augmentation mastopexy with lateral chest wall extension, and an upper arm brachioplasty.

## 2022-05-10 ENCOUNTER — HOSPITAL ENCOUNTER (OUTPATIENT)
Age: 52
Setting detail: OUTPATIENT SURGERY
Discharge: HOME | End: 2022-05-10
Attending: SURGERY | Admitting: SURGERY
Payer: SELF-PAY

## 2022-05-10 ENCOUNTER — ANESTHESIA (OUTPATIENT)
Dept: MEDSURG UNIT | Age: 52
End: 2022-05-10
Payer: SELF-PAY

## 2022-05-10 ENCOUNTER — ANESTHESIA EVENT (OUTPATIENT)
Dept: MEDSURG UNIT | Age: 52
End: 2022-05-10
Payer: SELF-PAY

## 2022-05-10 VITALS
WEIGHT: 158.07 LBS | TEMPERATURE: 96.8 F | HEART RATE: 81 BPM | BODY MASS INDEX: 26.3 KG/M2 | SYSTOLIC BLOOD PRESSURE: 114 MMHG | OXYGEN SATURATION: 95 % | RESPIRATION RATE: 13 BRPM | DIASTOLIC BLOOD PRESSURE: 75 MMHG

## 2022-05-10 LAB
GLUCOSE BLD STRIP.AUTO-MCNC: 175 MG/DL (ref 65–117)
GLUCOSE BLD STRIP.AUTO-MCNC: 96 MG/DL (ref 65–117)
SERVICE CMNT-IMP: ABNORMAL
SERVICE CMNT-IMP: NORMAL

## 2022-05-10 PROCEDURE — 77030026438 HC STYL ET INTUB CARD -A: Performed by: STUDENT IN AN ORGANIZED HEALTH CARE EDUCATION/TRAINING PROGRAM

## 2022-05-10 PROCEDURE — 77030038692 HC WND DEB SYS IRMX -B: Performed by: SURGERY

## 2022-05-10 PROCEDURE — 77030031139 HC SUT VCRL2 J&J -A: Performed by: SURGERY

## 2022-05-10 PROCEDURE — 74011250636 HC RX REV CODE- 250/636: Performed by: NURSE ANESTHETIST, CERTIFIED REGISTERED

## 2022-05-10 PROCEDURE — 77030008462 HC STPLR SKN PROX J&J -A: Performed by: SURGERY

## 2022-05-10 PROCEDURE — 77030026227 HC FUNL KELLR 2 PCH ALGN -C: Performed by: SURGERY

## 2022-05-10 PROCEDURE — 77030008684 HC TU ET CUF COVD -B: Performed by: STUDENT IN AN ORGANIZED HEALTH CARE EDUCATION/TRAINING PROGRAM

## 2022-05-10 PROCEDURE — 74011000250 HC RX REV CODE- 250: Performed by: NURSE ANESTHETIST, CERTIFIED REGISTERED

## 2022-05-10 PROCEDURE — 74011250637 HC RX REV CODE- 250/637: Performed by: STUDENT IN AN ORGANIZED HEALTH CARE EDUCATION/TRAINING PROGRAM

## 2022-05-10 PROCEDURE — 77030040361 HC SLV COMPR DVT MDII -B: Performed by: SURGERY

## 2022-05-10 PROCEDURE — 76060000070 HC AMB SURG ANES 5 TO 5.5 HR: Performed by: SURGERY

## 2022-05-10 PROCEDURE — 74011250636 HC RX REV CODE- 250/636: Performed by: STUDENT IN AN ORGANIZED HEALTH CARE EDUCATION/TRAINING PROGRAM

## 2022-05-10 PROCEDURE — 74011000250 HC RX REV CODE- 250: Performed by: SURGERY

## 2022-05-10 PROCEDURE — 77030040922 HC BLNKT HYPOTHRM STRY -A

## 2022-05-10 PROCEDURE — 76210000036 HC AMBSU PH I REC 1.5 TO 2 HR: Performed by: SURGERY

## 2022-05-10 PROCEDURE — 2709999900 HC NON-CHARGEABLE SUPPLY: Performed by: SURGERY

## 2022-05-10 PROCEDURE — 74011250636 HC RX REV CODE- 250/636: Performed by: SURGERY

## 2022-05-10 PROCEDURE — 74011000250 HC RX REV CODE- 250: Performed by: STUDENT IN AN ORGANIZED HEALTH CARE EDUCATION/TRAINING PROGRAM

## 2022-05-10 PROCEDURE — 82962 GLUCOSE BLOOD TEST: CPT

## 2022-05-10 PROCEDURE — 77030002933 HC SUT MCRYL J&J -A: Performed by: SURGERY

## 2022-05-10 PROCEDURE — 76030000010 HC AMB SURG OR TIME 5 TO 5.5: Performed by: SURGERY

## 2022-05-10 PROCEDURE — 77030041680 HC PNCL ELECSURG SMK EVAC CNMD -B: Performed by: SURGERY

## 2022-05-10 DEVICE — SMOOTH ROUND MODERATE PLUS PROFILE GEL-FILLED BREAST IMPLANT, 450CC  SMOOTH ROUND SILICONE
Type: IMPLANTABLE DEVICE | Site: BREAST | Status: FUNCTIONAL
Brand: MENTOR MEMORYGEL BREAST IMPLANT

## 2022-05-10 RX ORDER — HYDROMORPHONE HYDROCHLORIDE 2 MG/ML
INJECTION, SOLUTION INTRAMUSCULAR; INTRAVENOUS; SUBCUTANEOUS AS NEEDED
Status: DISCONTINUED | OUTPATIENT
Start: 2022-05-10 | End: 2022-05-10 | Stop reason: HOSPADM

## 2022-05-10 RX ORDER — SCOLOPAMINE TRANSDERMAL SYSTEM 1 MG/1
1 PATCH, EXTENDED RELEASE TRANSDERMAL
Status: DISCONTINUED | OUTPATIENT
Start: 2022-05-10 | End: 2022-05-10 | Stop reason: HOSPADM

## 2022-05-10 RX ORDER — SODIUM CHLORIDE, SODIUM LACTATE, POTASSIUM CHLORIDE, CALCIUM CHLORIDE 600; 310; 30; 20 MG/100ML; MG/100ML; MG/100ML; MG/100ML
INJECTION, SOLUTION INTRAVENOUS
Status: DISCONTINUED | OUTPATIENT
Start: 2022-05-10 | End: 2022-05-10 | Stop reason: HOSPADM

## 2022-05-10 RX ORDER — ONDANSETRON 2 MG/ML
4 INJECTION INTRAMUSCULAR; INTRAVENOUS AS NEEDED
Status: DISCONTINUED | OUTPATIENT
Start: 2022-05-10 | End: 2022-05-10 | Stop reason: HOSPADM

## 2022-05-10 RX ORDER — MIDAZOLAM HYDROCHLORIDE 1 MG/ML
INJECTION, SOLUTION INTRAMUSCULAR; INTRAVENOUS AS NEEDED
Status: DISCONTINUED | OUTPATIENT
Start: 2022-05-10 | End: 2022-05-10 | Stop reason: HOSPADM

## 2022-05-10 RX ORDER — ROCURONIUM BROMIDE 10 MG/ML
INJECTION, SOLUTION INTRAVENOUS AS NEEDED
Status: DISCONTINUED | OUTPATIENT
Start: 2022-05-10 | End: 2022-05-10 | Stop reason: HOSPADM

## 2022-05-10 RX ORDER — SUCCINYLCHOLINE CHLORIDE 20 MG/ML
INJECTION INTRAMUSCULAR; INTRAVENOUS AS NEEDED
Status: DISCONTINUED | OUTPATIENT
Start: 2022-05-10 | End: 2022-05-10 | Stop reason: HOSPADM

## 2022-05-10 RX ORDER — LIDOCAINE HYDROCHLORIDE 20 MG/ML
INJECTION, SOLUTION EPIDURAL; INFILTRATION; INTRACAUDAL; PERINEURAL AS NEEDED
Status: DISCONTINUED | OUTPATIENT
Start: 2022-05-10 | End: 2022-05-10 | Stop reason: HOSPADM

## 2022-05-10 RX ORDER — PROPOFOL 10 MG/ML
INJECTION, EMULSION INTRAVENOUS AS NEEDED
Status: DISCONTINUED | OUTPATIENT
Start: 2022-05-10 | End: 2022-05-10 | Stop reason: HOSPADM

## 2022-05-10 RX ORDER — SODIUM CHLORIDE 0.9 % (FLUSH) 0.9 %
5-40 SYRINGE (ML) INJECTION EVERY 8 HOURS
Status: DISCONTINUED | OUTPATIENT
Start: 2022-05-10 | End: 2022-05-10 | Stop reason: HOSPADM

## 2022-05-10 RX ORDER — SODIUM CHLORIDE, SODIUM LACTATE, POTASSIUM CHLORIDE, CALCIUM CHLORIDE 600; 310; 30; 20 MG/100ML; MG/100ML; MG/100ML; MG/100ML
50 INJECTION, SOLUTION INTRAVENOUS CONTINUOUS
Status: DISCONTINUED | OUTPATIENT
Start: 2022-05-10 | End: 2022-05-10 | Stop reason: HOSPADM

## 2022-05-10 RX ORDER — ONDANSETRON 2 MG/ML
INJECTION INTRAMUSCULAR; INTRAVENOUS AS NEEDED
Status: DISCONTINUED | OUTPATIENT
Start: 2022-05-10 | End: 2022-05-10 | Stop reason: HOSPADM

## 2022-05-10 RX ORDER — ACETAMINOPHEN 325 MG/1
650 TABLET ORAL ONCE
Status: DISCONTINUED | OUTPATIENT
Start: 2022-05-10 | End: 2022-05-10 | Stop reason: HOSPADM

## 2022-05-10 RX ORDER — SODIUM CHLORIDE 0.9 % (FLUSH) 0.9 %
5-40 SYRINGE (ML) INJECTION AS NEEDED
Status: DISCONTINUED | OUTPATIENT
Start: 2022-05-10 | End: 2022-05-10 | Stop reason: HOSPADM

## 2022-05-10 RX ORDER — FENTANYL CITRATE 50 UG/ML
INJECTION, SOLUTION INTRAMUSCULAR; INTRAVENOUS AS NEEDED
Status: DISCONTINUED | OUTPATIENT
Start: 2022-05-10 | End: 2022-05-10 | Stop reason: HOSPADM

## 2022-05-10 RX ORDER — GLYCOPYRROLATE 0.2 MG/ML
INJECTION INTRAMUSCULAR; INTRAVENOUS AS NEEDED
Status: DISCONTINUED | OUTPATIENT
Start: 2022-05-10 | End: 2022-05-10 | Stop reason: HOSPADM

## 2022-05-10 RX ORDER — DEXAMETHASONE SODIUM PHOSPHATE 4 MG/ML
INJECTION, SOLUTION INTRA-ARTICULAR; INTRALESIONAL; INTRAMUSCULAR; INTRAVENOUS; SOFT TISSUE AS NEEDED
Status: DISCONTINUED | OUTPATIENT
Start: 2022-05-10 | End: 2022-05-10 | Stop reason: HOSPADM

## 2022-05-10 RX ORDER — HYDROMORPHONE HYDROCHLORIDE 1 MG/ML
0.2 INJECTION, SOLUTION INTRAMUSCULAR; INTRAVENOUS; SUBCUTANEOUS
Status: DISCONTINUED | OUTPATIENT
Start: 2022-05-10 | End: 2022-05-10 | Stop reason: HOSPADM

## 2022-05-10 RX ORDER — SCOLOPAMINE TRANSDERMAL SYSTEM 1 MG/1
1 PATCH, EXTENDED RELEASE TRANSDERMAL ONCE
Status: DISCONTINUED | OUTPATIENT
Start: 2022-05-10 | End: 2022-05-10 | Stop reason: HOSPADM

## 2022-05-10 RX ORDER — FENTANYL CITRATE 50 UG/ML
25 INJECTION, SOLUTION INTRAMUSCULAR; INTRAVENOUS
Status: DISCONTINUED | OUTPATIENT
Start: 2022-05-10 | End: 2022-05-10 | Stop reason: HOSPADM

## 2022-05-10 RX ORDER — LIDOCAINE HYDROCHLORIDE 10 MG/ML
0.1 INJECTION, SOLUTION EPIDURAL; INFILTRATION; INTRACAUDAL; PERINEURAL AS NEEDED
Status: DISCONTINUED | OUTPATIENT
Start: 2022-05-10 | End: 2022-05-10 | Stop reason: HOSPADM

## 2022-05-10 RX ADMIN — WATER 2 G: 1 INJECTION INTRAMUSCULAR; INTRAVENOUS; SUBCUTANEOUS at 12:16

## 2022-05-10 RX ADMIN — HYDROMORPHONE HYDROCHLORIDE 0.6 MG: 2 INJECTION, SOLUTION INTRAMUSCULAR; INTRAVENOUS; SUBCUTANEOUS at 12:46

## 2022-05-10 RX ADMIN — HYDROMORPHONE HYDROCHLORIDE 0.4 MG: 2 INJECTION, SOLUTION INTRAMUSCULAR; INTRAVENOUS; SUBCUTANEOUS at 12:32

## 2022-05-10 RX ADMIN — DEXAMETHASONE SODIUM PHOSPHATE 8 MG: 4 INJECTION, SOLUTION INTRAMUSCULAR; INTRAVENOUS at 08:14

## 2022-05-10 RX ADMIN — LIDOCAINE HYDROCHLORIDE 40 MG: 20 INJECTION, SOLUTION EPIDURAL; INFILTRATION; INTRACAUDAL; PERINEURAL at 07:41

## 2022-05-10 RX ADMIN — SODIUM CHLORIDE 60 MCG/MIN: 9 INJECTION, SOLUTION INTRAVENOUS at 07:49

## 2022-05-10 RX ADMIN — GLYCOPYRROLATE 0.2 MG: 0.2 INJECTION, SOLUTION INTRAMUSCULAR; INTRAVENOUS at 08:13

## 2022-05-10 RX ADMIN — SODIUM CHLORIDE 5 MG: 9 INJECTION INTRAMUSCULAR; INTRAVENOUS; SUBCUTANEOUS at 12:53

## 2022-05-10 RX ADMIN — FENTANYL CITRATE 100 MCG: 50 INJECTION, SOLUTION INTRAMUSCULAR; INTRAVENOUS at 07:41

## 2022-05-10 RX ADMIN — ROCURONIUM BROMIDE 10 MG: 10 SOLUTION INTRAVENOUS at 07:41

## 2022-05-10 RX ADMIN — HYDROMORPHONE HYDROCHLORIDE 0.4 MG: 2 INJECTION, SOLUTION INTRAMUSCULAR; INTRAVENOUS; SUBCUTANEOUS at 08:50

## 2022-05-10 RX ADMIN — ONDANSETRON HYDROCHLORIDE 4 MG: 2 INJECTION, SOLUTION INTRAMUSCULAR; INTRAVENOUS at 12:21

## 2022-05-10 RX ADMIN — SODIUM CHLORIDE, POTASSIUM CHLORIDE, SODIUM LACTATE AND CALCIUM CHLORIDE: 600; 310; 30; 20 INJECTION, SOLUTION INTRAVENOUS at 07:26

## 2022-05-10 RX ADMIN — SODIUM CHLORIDE, POTASSIUM CHLORIDE, SODIUM LACTATE AND CALCIUM CHLORIDE: 600; 310; 30; 20 INJECTION, SOLUTION INTRAVENOUS at 11:12

## 2022-05-10 RX ADMIN — SUCCINYLCHOLINE CHLORIDE 120 MG: 20 INJECTION, SOLUTION INTRAMUSCULAR; INTRAVENOUS at 07:41

## 2022-05-10 RX ADMIN — WATER 2 G: 1 INJECTION INTRAMUSCULAR; INTRAVENOUS; SUBCUTANEOUS at 08:16

## 2022-05-10 RX ADMIN — MIDAZOLAM 2 MG: 1 INJECTION INTRAMUSCULAR; INTRAVENOUS at 07:26

## 2022-05-10 RX ADMIN — ONDANSETRON HYDROCHLORIDE 4 MG: 2 SOLUTION INTRAMUSCULAR; INTRAVENOUS at 13:55

## 2022-05-10 RX ADMIN — PROPOFOL 150 MG: 10 INJECTION, EMULSION INTRAVENOUS at 07:41

## 2022-05-10 NOTE — INTERVAL H&P NOTE
Update History & Physical    The Patient's History and Physical of April 29, 2022 was reviewed with the patient and I examined the patient. There was no change. The surgical site was confirmed by the patient and me. Plan:  The risk, benefits, expected outcome, and alternative to the recommended procedure have been discussed with the patient. Patient understands and wants to proceed with the procedure.     Electronically signed by Harris Meyers MD on 5/10/2022 at 12:38 PM

## 2022-05-10 NOTE — BRIEF OP NOTE
Brief Postoperative Note    Patient: Savannah Martinez  YOB: 1970  MRN: [id number]    Date of Procedure: 5/10/2022     Pre-Op Diagnosis: COSMETIC    Post-Op Diagnosis: Same as preoperative diagnosis. Procedure(s):  BILATERAL BRACHIOPLASTY AND BILATERAL BREAST AUGMENTATION WITH MASTOPEXY    Surgeon(s):  Yayo Trujillo MD    Surgical Assistant: None    Anesthesia: General     Estimated Blood Loss (mL): less than 357     Complications: None    Specimens: * No specimens in log *     Implants:   Implant Name Type Inv.  Item Serial No.  Lot No. LRB No. Used Action   IMPLANT BRST 450CC DIA13.6CM P4.2CM MARTHA [employer] RND MOD - [id number]  IMPLANT BRST 450CC DIA13.6CM P4.2CM MARTHA GEL SMOOTH RND MOD [id number] [employer] [id number] Right 1 Implanted   IMPLANT BRST 450CC DIA13.6CM P4.2CM MARTHA G[employer] SMOOTH RND MOD - [id number]  IMPLANT BRST 450CC DIA13.6CM P4.2CM MARTHA GEL SMOOTH RND MOD [id number] [employer] [id number] Left 1 Implanted       Drains: * No LDAs found *    Findings: normal for age and history    Electronically Signed by Damien Crowell MD on 5/10/2022 at 12:38 PM

## 2022-05-10 NOTE — ROUTINE PROCESS
Patient: Jossie Coy MRN: [id number]  SSN: [id number]   YOB: 1970  Age: 45 y.o. Sex: female     Patient is status post Procedure(s) with comments:  BILATERAL BRACHIOPLASTY AND BILATERAL BREAST AUGMENTATION WITH MASTOPEXY - AND BILATERAL BREAST.     Surgeon(s) and Role:     * Kathryn Arredondo MD - Primary    Local/Dose/Irrigation:  SEE MAR, GUILLERMOPT                          Airway - Endotracheal Tube 05/10/22 Oral (Active)                   Dressing/Packing:  Incision 05/10/22 Arm-Dressing/Treatment: Ace wrap;Gauze dressing/dressing sponge;Xeroform (05/10/22 0900)    Splint/Cast:  ]    Other:

## 2022-05-10 NOTE — PERIOP NOTES
Reviewed discharge instructions with patient's  via phone. Patient's  verbalized understanding. Paper copy given to patient. No further questions at this time.

## 2022-05-10 NOTE — ANESTHESIA POSTPROCEDURE EVALUATION
Post-Anesthesia Evaluation and Assessment    Patient: Maria D Ruffin MRN: [id number]  SSN: [id number]    YOB: 1970  Age: 45 y.o. Sex: female      I have evaluated the patient and they are stable and ready for discharge from the PACU. Cardiovascular Function/Vital Signs  Visit Vitals  /72   Pulse 80   Temp 36 °C (96.8 °F)   Resp 12   Wt 71.7 kg (158 lb 1.1 oz)   SpO2 93%   BMI 26.30 kg/m²       Patient is status post General anesthesia for Procedure(s) with comments:  BILATERAL BRACHIOPLASTY AND BILATERAL BREAST AUGMENTATION WITH MASTOPEXY - AND BILATERAL BREAST. Nausea/Vomiting: None    Postoperative hydration reviewed and adequate. Pain:  Pain Scale 1: FLACC (05/10/22 1324)  Pain Intensity 1: 0 (05/10/22 1324)   Managed    Neurological Status: At baseline    Mental Status, Level of Consciousness: Alert and  oriented to person, place, and time    Pulmonary Status:   O2 Device: None (Room air) (05/10/22 1317)   Adequate oxygenation and airway patent    Complications related to anesthesia: None    Post-anesthesia assessment completed. No concerns.      Signed By: Kg Brennan DO     May 10, 2022

## 2022-05-10 NOTE — DISCHARGE INSTRUCTIONS
Leave the surgical garments ON and DRY for 48 hours then may remove for shower. Do not remove before then  Resume any important pre op medications and diet BUT use sport drinks like Gatorade or Power aid instead of water for 2-3 days and extra protein in diet  Helps wounds heal.  Keep head elevated at night when sleeping about 30 degrees, do not lay flat for about 2 weeks  Keep arms/hands elevated at your sides and elbows straight as much as possible to minimize swelling, but expect some. Walk every 1-2 hours during the day in house while awake for 5-10 minutes during the first 2 weeks  Use stool softeners to prevent constipation  Do not take pain medications on an empty stomach  NO strenuous activity or exercise for 4 weeks  When you do shower, remove the ace wraps from the arms and save them, remove the surgical bra and the white implant strap, discard any lose gauze, shower like you normally would (no baths), place antibiotic ointment of choice over all incisions with clean gauze, and place the second bra/vest on as you found the first one. Be sure to safety pin the white implant strap to the back/bottom of the garment like you found it, and use WITH the bra/vest at all times until first follow up appointment. You may use one of the arm compression sleeve garments now over the gauze on the arms instead of the ace wraps. You may now repeat daily or every other day depending on your preference. Call DR. Halina Ndiaye at [phone] (cell) for questions  Anticipate a phone call from Dr. Halina Ndiaye [facility]    Patient Education   Scopolamine (Absorbed through the skin)   Scopolamine (icly-MJO-h-meen)  Treat nausea and vomiting. Brand Name(s): Transderm Scop   There may be other brand names for this medicine. When This Medicine Should Not Be Used: You should not use this medicine if you have had an allergic reaction to scopolamine, or if you have narrow angle glaucoma.   How to Use This Medicine:   Patch  · Your doctor will tell you how many patches to use, where to apply them, and how often to apply them. Do not use more patches or apply them more often than your doctor tells you to. · Read and follow the patient instructions that come with this medicine. Talk to your doctor or pharmacist if you have any questions. · To prevent motion sickness, apply the patch at least 4 hours before you need it. · Wash and dry your hands thoroughly before applying the patch. · Leave the patch in its sealed wrapper until you are ready to put it on. Tear the wrapper open carefully. NEVER CUT the wrapper or the patch with scissors. Do not use any patch that has been cut by accident. · Take the liner off the sticky side before applying. · Apply the patch to dry, hairless skin behind the ear. · If the patch is loose or falls off, apply a new patch at a different place behind the ear. · After you take off the patch, wash the place where the patch was and your hands thoroughly. · Only one patch should be used at any time. If a dose is missed:   · If you forget to wear or change a patch, put one on as soon as you can. If it is almost time to put on your next patch, wait until then to apply a new patch and skip the one you missed. Do not apply extra patches to make up for a missed dose. How to Store and Dispose of This Medicine:   · Store the patches at room temperature in a closed container, away from heat, moisture, and direct light. · Fold the used patch in half with the sticky sides together. Throw any used patch away so that children or pets cannot get to it. You will also need to throw away old patches after the expiration date has passed. · Keep all medicine out of the reach of children. Never share your medicine with anyone. Drugs and Foods to Avoid:   Ask your doctor or pharmacist before using any other medicine, including over-the-counter medicines, vitamins, and herbal products.   · Tell your doctor if you use anything else that makes you sleepy. Some examples are allergy medicine, narcotic pain medicine, and alcohol. · Do not drink alcohol while you are using this medicine. Warnings While Using This Medicine:   · Make sure your doctor knows if you are pregnant or breastfeeding, or if you have glaucoma, prostate problems, trouble urinating, blocked bowels, liver disease, kidney disease, or a history of seizures or mental illness. · This medicine can cause blurring of vision and other vision problems if it comes in contact with the eyes. This medicine may also cause problems with urination. If any of these reactions occur, remove the patch and call your doctor right away. · This medicine may make you dizzy or drowsy. Avoid driving, using machines, or doing anything else that could be dangerous if you are not alert. If you plan to participate in underwater sports, this medicine may cause disorienting effects. If this is a concern for you, talk with your doctor. · This medicine may make you sweat less and cause your body to get too hot. Be careful in hot weather, when you are exercising, or if using a sauna or whirlpool. · Tell any doctor or dentist who treats you that you are using this medicine. This medicine may affect certain medical test results. · Skin burns have been reported at the patch site in several patients wearing an aluminized transdermal system during a magnetic resonance imaging scan (MRI). Because Transderm Sc?p® contains aluminum, it is recommended to remove the system before undergoing an MRI. Possible Side Effects While Using This Medicine:   Call your doctor right away if you notice any of these side effects:  · Allergic reaction: Itching or hives, swelling in your face or hands, swelling or tingling in your mouth or throat, chest tightness, trouble breathing  · Blurred vision. · Confusion or memory loss. · Fast, slow, or uneven heartbeat. · Lightheadedness, dizziness, drowsiness, or fainting.   · Seeing, hearing, or feeling things that are not there. · Severe eye pain. · Trouble urinating. If you notice these less serious side effects, talk with your doctor:   · Dry mouth. · Dry, itchy, or red eyes. · Restlessness. · Skin rash or redness. If you notice other side effects that you think are caused by this medicine, tell your doctor. Call your doctor for medical advice about side effects. You may report side effects to FDA at 0-491-XKX-5223  © 2017 [facility] Information is for End User's use only and may not be sold, redistributed or otherwise used for commercial purposes. The above information is an  only. It is not intended as medical advice for individual conditions or treatments. Talk to your doctor, nurse or pharmacist before following any medical regimen to see if it is safe and effective for you.

## 2022-05-10 NOTE — ANESTHESIA PREPROCEDURE EVALUATION
Anesthetic History     PONV          Review of Systems / Medical History  Patient summary reviewed, nursing notes reviewed and pertinent labs reviewed    Pulmonary  Within defined limits                 Neuro/Psych         Psychiatric history (depression)     Cardiovascular    Hypertension              Exercise tolerance: >4 METS     GI/Hepatic/Renal     GERD           Endo/Other    Diabetes: type 2  Hypothyroidism  Arthritis     Other Findings              Physical Exam    Airway  Mallampati: I  TM Distance: 4 - 6 cm  Neck ROM: normal range of motion   Mouth opening: Normal     Cardiovascular    Rhythm: regular  Rate: normal         Dental    Dentition: Lower dentition intact and Upper dentition intact     Pulmonary  Breath sounds clear to auscultation               Abdominal  GI exam deferred       Other Findings            Anesthetic Plan    ASA: 3  Anesthesia type: general          Induction: Intravenous  Anesthetic plan and risks discussed with: Patient

## 2022-05-10 NOTE — OP NOTES
[address] [facility]  OPERATIVE REPORT    Name:  Daren Wilkes  MR#:  [id number]  :  1970  ACCOUNT #:  [de-identified]  DATE OF SERVICE:  05/10/2022      PREOPERATIVE DIAGNOSES:  Personal history of obesity and marked weight loss, personal history of previous elective body contouring surgery, recent presentation grade III breast ptosis, postpartum involution, soft tissue laxity of the lateral chest wall and upper arms, desires additional elective body contouring surgery. POSTOPERATIVE DIAGNOSES:  Personal history of obesity and marked weight loss, personal history of previous elective body contouring surgery, recent presentation grade III breast ptosis, postpartum involution, soft tissue laxity of the lateral chest wall and upper arms, desires additional elective body contouring surgery. PROCEDURES PERFORMED:  Bilateral upper arm excision dermal lipectomy/brachioplasty and bilateral submuscular cohesive silicone gel breast augmentation and Valera pattern mastopexy. SURGEON:  Dennis Dietrich MD    ASSISTANT:  Ronnie Meza. ANESTHESIA:  General.    COMPLICATIONS:  None. SPECIMENS REMOVED:  Skin and fat right arm 283 g, skin and fat left arm 256 g discarded. Skin each breast negligible not weighed, discarded. IMPLANTS:  450-mL smooth round moderate plus profile cohesive gel device bilaterally, previously described. ESTIMATED BLOOD LOSS:  Approximately 75 mL. IV FLUIDS:  Approximately 1800 mL. URINE OUTPUT:  In the Carter, 600 mL. DRAINS:  None. STAFF:  OR staff, room #3, 7th floor, [address] [place] [address]:  Normal for age and history. INDICATIONS FOR PROCEDURE:  The patient is a pleasant 24-year-old female who has a history of morbid obesity and underwent Kaveh-en-Y gastric bypass many years ago and marked weight loss. In , she underwent a circumferential/belt abdominoplasty, recovered from her procedure and experienced a nice result.   She was seen recently with a desire to address additional changes to her breast from time, gravity, her weight loss and obesity, and childbirth. In addition secondary to her weight loss, she has had some acquired soft tissue changes and laxity to her upper arms consistent with a small batwing deformity. Preoperatively, she selected a 450-mL smooth round moderate plus profile cohesive silicone gel breast implant in a submuscular plane. She will require a skin tag procedure or mastopexy combined with her implant to provide with most ideal result. She has also felt to be an acceptable candidate for an excision dermal lipectomy of the upper arms, also called brachioplasty, comes in today for these combined procedures. OPERATIVE PROCEDURE:  After appropriate consent was obtained, preoperative markings were placed. She was taken to the operating room and placed on the operative table in supine position. Satisfactory general endotracheal anesthesia was obtained. SCD devices were placed per routine. A Carter catheter was also placed to help facilitate intraoperative fluid management. We began with upper arm brachioplasty and injected our local anesthesia solution in each upper arm from the elbow to the axilla based on our preoperative markings. Both areas were sterilely prepped and draped. We performed identical procedure bilaterally beginning on the patient's right side. Tailor tacking was performed first based on our preoperative markings with surgical staples based on our markings. Any adjustments were made with the markings, the staples removed, and the markings were reinforced, and all skin incisions were made with a #10 scalpel blade. The electrocautery was then used to make our skin incisions after a #10 scalpel blade to complete the dissection including the skin and underlying fat from the elbow to the axilla on top of the muscle fascia. Care was taken to avoid any injury to obvious neurovascular structures. With minimal undermining with electrocautery and aid of a facelift retractor, the wounds were irrigated with Irrisept irrigation and sterile saline. The wounds were then closed in two layers reapproximating the deep fascia and the superficial fascia with a 3-0 PDS as an interrupted deep simple stitch, and the skin was reapproximated with 2-0 Monocryl in running subcuticular fashion. At the completion of both upper arms, they were dressed after the skin was cleaned with Xeroform gauze, 4x4 gauze, Kerlix roll, and a 6-inch Ace wrap. The arms were then gently padded and taped and secured with upper armboards with towels and 3-inch silk tape. The local anesthesia solution was also injected on the breast based on our preoperative markings. Her chest was sterilely prepped and draped. We also performed identical procedure on the breast beginning on the patient's right side reducing the nipple-areolar complex to 42 mm with cookie cutter. We made an incision in the vertical meridian line with a #10 scalpel blade and the electrocautery was used to dissect through the subcutaneous tissues to the pectoralis major muscle. A submuscular or subpectoral pocket was then dissected in standard fashion under direct lighted visualization releasing the inferomedial fibers of the pectoralis major muscle from the sternal attachments and the lateral chest wall tissues also released with electrocautery based on our markings. Once each pocket was dissected, they were inspected for symmetry and hemostasis. At this point, a [place] reference number [id number] smooth round moderate plus profile [id number] cohesive silicone gel breast implant was placed in each pocket. On the right side, it was lot number [id number]. On the left side, it was lot number [id number]. Tailor tacking was performed on the breast skin as well based on our markings and the patient was sat upright to inspect shape, size, and symmetry.   This was felt to be excellent. She was placed back in supine position. The staples were removed and the markings were reinforced. The pockets were irrigated with Irrisept irrigation solution and sterile saline. The breast tissues were then reapproximated in two layers with 3-0 Vicryl as an interrupted simple stitch. There was no undermining of the skin flaps and the central skin island was de-epithelialized with a #10 scalpel blade and the mastopexy incisions were closed in two layers with 3-0 Vicryl and 3-0 Monocryl. The new nipple position was marked approximately 6.5 cm from the inframammary fold and marked with a cookie cutter and she was sat upright for position. This was felt to be excellent. She was then placed back in supine position, the intervening skin was removed with #10 scalpel blade and the  nipple-areolar complex was brought out through the new position and secured in two layers with 3-0 Vicryl and 4-0 Monocryl sutures. The lateral inframammary fold incision was extended laterally on the chest wall to contour the lateral chest wall as well. At the end of both procedures, sponge and needle counts were reported as correct. She was awakened, extubated, and transferred to the recovery room in satisfactory and stable condition. She will be discharged home today in care of her family with prescriptions and instructions and will follow up with me within 2 weeks.         MD ESTELLA [id number]  D:  05/10/2022 12:55  T:  05/10/2022 19:33  JOB #:  [id number]  CC:  Yamila Pittman MD

## 2022-09-24 NOTE — TELEPHONE ENCOUNTER
Noted in order. Notes faxed to specialist. Per call to insurance no referral needed.
Per call from Patient,    appt is 10/18/18  / VANESSA De La Rosa    Fax [phone]    Office notes needed
Referral to patient's endocrinologist (Dr Uday Hendrickson) placed.
never

## (undated) DEVICE — Device

## (undated) DEVICE — SPONGE GZ W4XL4IN COT 12 PLY TYP VII WVN C FLD DSGN

## (undated) DEVICE — SOLUTION IRRIG 1000ML 0.9% SOD CHL USP POUR PLAS BTL

## (undated) DEVICE — HANDLE LT SNAP ON ULT DURABLE LENS FOR TRUMPF ALC DISPOSABLE

## (undated) DEVICE — TRAY PREP DRY W/ PREM GLV 2 APPL 6 SPNG 2 UNDPD 1 OVERWRAP

## (undated) DEVICE — GLOVE ORANGE PI 7 1/2   MSG9075

## (undated) DEVICE — SUTURE VCRL SZ 3-0 L27IN ABSRB UD L24MM FS-1 3/8 CIR REV J442H

## (undated) DEVICE — STRIP,CLOSURE,WOUND,MEDI-STRIP,1/2X4: Brand: MEDLINE

## (undated) DEVICE — NEEDLE HYPO 22GA L1.5IN BLK POLYPR HUB S STL REG BVL STR

## (undated) DEVICE — REM POLYHESIVE ADULT PATIENT RETURN ELECTRODE: Brand: VALLEYLAB

## (undated) DEVICE — DRESSING,GAUZE,XEROFORM,CURAD,1"X8",ST: Brand: CURAD

## (undated) DEVICE — SUTURE MCRYL SZ 3-0 L27IN ABSRB UD L24MM PS-1 3/8 CIR PRIM Y936H

## (undated) DEVICE — SUTURE MCRYL SZ 4-0 L27IN ABSRB UD L19MM PS-2 1/2 CIR PRIM Y426H

## (undated) DEVICE — ROCKER SWITCH PENCIL BLADE ELECTRODE, HOLSTER: Brand: EDGE

## (undated) DEVICE — 450 ML BOTTLE OF 0.05% CHLORHEXIDINE GLUCONATE IN 99.95% STERILE WATER FOR IRRIGATION, USP AND APPLICATOR.: Brand: IRRISEPT ANTIMICROBIAL WOUND LAVAGE

## (undated) DEVICE — PACK,ORTOHMAX/CVMAX,UNIVERSAL,5/CS: Brand: MEDLINE

## (undated) DEVICE — GARMENT,MEDLINE,DVT,INT,CALF,MED, GEN2: Brand: MEDLINE

## (undated) DEVICE — UNDERPAD INCON STD 36X23IN --

## (undated) DEVICE — BANDAGE COBAN 4 IN COMPR W4INXL5YD FOAM COHESIVE QUIK STK SELF ADH SFT

## (undated) DEVICE — PENCIL SMK EVAC L10FT DIA95MM TBNG NONSTICK W ADPT TO 22MM

## (undated) DEVICE — INTENDED FOR TISSUE SEPARATION, AND OTHER PROCEDURES THAT REQUIRE A SHARP SURGICAL BLADE TO PUNCTURE OR CUT.: Brand: BARD-PARKER ® CARBON RIB-BACK BLADES

## (undated) DEVICE — BNDG ELAS HK LOOP 6X5YD NS -- MATRIX

## (undated) DEVICE — PLASTICS CHEST BREAST ASU: Brand: MEDLINE INDUSTRIES, INC.

## (undated) DEVICE — STOCKINETTE,IMPERVIOUS,12X48,STERILE: Brand: MEDLINE

## (undated) DEVICE — MASTISOL ADHESIVE LIQ 2/3ML

## (undated) DEVICE — BANDAGE,GAUZE,BULKEE II,4.5"X4.1YD,STRL: Brand: MEDLINE

## (undated) DEVICE — DRAPE,REIN 53X77,STERILE: Brand: MEDLINE

## (undated) DEVICE — TOWEL SURG W17XL27IN STD BLU COT NONFENESTRATED PREWASHED

## (undated) DEVICE — PAD,ABDOMINAL,5"X9",ST,LF,25/BX: Brand: MEDLINE INDUSTRIES, INC.

## (undated) DEVICE — DECANTER BAG 9": Brand: MEDLINE INDUSTRIES, INC.

## (undated) DEVICE — DRESSING,GAUZE,XEROFORM,CURAD,5"X9",ST: Brand: CURAD

## (undated) DEVICE — SYSTEM IMPL DEL FOR BRST IMPL FUN (SEE COMMENT)

## (undated) DEVICE — SYR 10ML LUER LOK 1/5ML GRAD --